# Patient Record
Sex: FEMALE | Race: WHITE | NOT HISPANIC OR LATINO | Employment: OTHER | URBAN - METROPOLITAN AREA
[De-identification: names, ages, dates, MRNs, and addresses within clinical notes are randomized per-mention and may not be internally consistent; named-entity substitution may affect disease eponyms.]

---

## 2023-03-10 PROBLEM — F41.9 ANXIETY: Status: ACTIVE | Noted: 2023-03-10

## 2023-03-10 PROBLEM — M17.0 OSTEOARTHRITIS OF BOTH KNEES: Status: ACTIVE | Noted: 2023-03-10

## 2023-03-10 PROBLEM — E66.811 CLASS 1 OBESITY WITH BODY MASS INDEX (BMI) OF 34.0 TO 34.9 IN ADULT: Status: ACTIVE | Noted: 2023-03-10

## 2023-03-10 PROBLEM — R05.9 COUGH: Status: ACTIVE | Noted: 2023-03-10

## 2023-03-10 PROBLEM — E66.9 CLASS 1 OBESITY WITH BODY MASS INDEX (BMI) OF 34.0 TO 34.9 IN ADULT: Status: ACTIVE | Noted: 2023-03-10

## 2023-03-10 PROBLEM — I15.9 BENIGN SECONDARY HYPERTENSION: Status: ACTIVE | Noted: 2023-03-10

## 2023-03-10 RX ORDER — METOPROLOL TARTRATE 50 MG/1
1 TABLET ORAL 2 TIMES DAILY
COMMUNITY
Start: 2017-01-22 | End: 2023-08-21 | Stop reason: SDUPTHER

## 2023-03-10 RX ORDER — CANDESARTAN 32 MG/1
1 TABLET ORAL DAILY
COMMUNITY
Start: 2017-01-22 | End: 2023-05-25 | Stop reason: SDUPTHER

## 2023-03-10 RX ORDER — HYDRALAZINE HYDROCHLORIDE 100 MG/1
1 TABLET, FILM COATED ORAL 2 TIMES DAILY
COMMUNITY
Start: 2017-01-22 | End: 2023-11-16 | Stop reason: SDUPTHER

## 2023-03-10 RX ORDER — CLONAZEPAM 1 MG/1
1 TABLET ORAL 2 TIMES DAILY PRN
COMMUNITY
Start: 2017-03-09 | End: 2023-03-14 | Stop reason: SDUPTHER

## 2023-03-14 ENCOUNTER — OFFICE VISIT (OUTPATIENT)
Dept: PRIMARY CARE | Facility: CLINIC | Age: 73
End: 2023-03-14
Payer: MEDICARE

## 2023-03-14 VITALS
BODY MASS INDEX: 35.81 KG/M2 | HEART RATE: 83 BPM | OXYGEN SATURATION: 97 % | WEIGHT: 194.6 LBS | SYSTOLIC BLOOD PRESSURE: 140 MMHG | HEIGHT: 62 IN | DIASTOLIC BLOOD PRESSURE: 72 MMHG | TEMPERATURE: 98.2 F

## 2023-03-14 DIAGNOSIS — F41.9 ANXIETY: Primary | ICD-10-CM

## 2023-03-14 PROCEDURE — 1036F TOBACCO NON-USER: CPT | Performed by: FAMILY MEDICINE

## 2023-03-14 PROCEDURE — 3077F SYST BP >= 140 MM HG: CPT | Performed by: FAMILY MEDICINE

## 2023-03-14 PROCEDURE — 1159F MED LIST DOCD IN RCRD: CPT | Performed by: FAMILY MEDICINE

## 2023-03-14 PROCEDURE — 3078F DIAST BP <80 MM HG: CPT | Performed by: FAMILY MEDICINE

## 2023-03-14 PROCEDURE — 99213 OFFICE O/P EST LOW 20 MIN: CPT | Performed by: FAMILY MEDICINE

## 2023-03-14 RX ORDER — COVID-19 MOLECULAR TEST ASSAY
KIT MISCELLANEOUS
COMMUNITY
Start: 2022-07-02

## 2023-03-14 RX ORDER — CLONAZEPAM 1 MG/1
1 TABLET ORAL 2 TIMES DAILY PRN
Qty: 60 TABLET | Refills: 2 | Status: SHIPPED | OUTPATIENT
Start: 2023-03-14 | End: 2023-03-15 | Stop reason: SDUPTHER

## 2023-03-14 ASSESSMENT — ENCOUNTER SYMPTOMS
LOSS OF SENSATION IN FEET: 0
DEPRESSION: 0
OCCASIONAL FEELINGS OF UNSTEADINESS: 0

## 2023-03-14 ASSESSMENT — PAIN SCALES - GENERAL: PAINLEVEL: 8

## 2023-03-14 NOTE — PROGRESS NOTES
Subjective   Patient ID: Jen Daly is a 73 y.o. female who presents for No chief complaint on file..   3 month med refills  HPI     Review of Systems    Objective   There were no vitals taken for this visit.    Physical Exam    Assessment/Plan   Problem List Items Addressed This Visit          Other    Anxiety - Primary        OARRS:  No data recorded  I have personally reviewed the OARRS report for Jen aDly. I have considered the risks of abuse, dependence, addiction and diversion    Is the patient prescribed a combination of a benzodiazepine and opioid?  Yes, I feel it is clincially indicated to continue the medication and have discussed with the patient risks/benefits/alternatives.    Last Urine Drug Screen / ordered today: Yes  Recent Results (from the past 34717 hour(s))   Drug Screen, Urine With Reflex to Confirmation    Collection Time: 12/13/22 12:00 AM   Result Value Ref Range    DRUG SCREEN COMMENT URINE SEE BELOW     Amphetamine Screen, Urine PRESUMPTIVE NEGATIVE NEGATIVE    Barbiturate Screen, Urine PRESUMPTIVE NEGATIVE NEGATIVE    BENZODIAZEPINE (PRESENCE) IN URINE BY SCREEN METHOD PRESUMPTIVE NEGATIVE NEGATIVE    Cannabinoid Screen, Urine PRESUMPTIVE NEGATIVE NEGATIVE    Cocaine Screen, Urine PRESUMPTIVE NEGATIVE NEGATIVE    Fentanyl, Ur PRESUMPTIVE NEGATIVE NEGATIVE    Methadone Screen, Urine PRESUMPTIVE NEGATIVE NEGATIVE    Opiate Screen, Urine PRESUMPTIVE NEGATIVE NEGATIVE    Oxycodone Screen, Ur PRESUMPTIVE NEGATIVE NEGATIVE    PCP Screen, Urine PRESUMPTIVE NEGATIVE NEGATIVE     Results are as expected.     Controlled Substance Agreement:  Date of the Last Agreement: 12/13/2022  Reviewed Controlled Substance Agreement including but not limited to the benefits, risks, and alternatives to treatment with a Controlled Substance medication(s).    Benzodiazepines:  What is the patient's goal of therapy? anxiety  Is this being achieved with current treatment? yes    JOAQUIN-7:  No data  recorded    Activities of Daily Living:   Is your overall impression that this patient is benefiting (symptom reduction outweighs side effects) from benzodiazepine therapy? Yes     1. Physical Functioning: Better  2. Family Relationship: Better  3. Social Relationship: Better  4. Mood: Better  5. Sleep Patterns: Better  6. Overall Function: Better

## 2023-03-15 ENCOUNTER — TELEPHONE (OUTPATIENT)
Dept: PRIMARY CARE | Facility: CLINIC | Age: 73
End: 2023-03-15
Payer: MEDICARE

## 2023-03-15 DIAGNOSIS — F41.9 ANXIETY: ICD-10-CM

## 2023-03-15 RX ORDER — CLONAZEPAM 1 MG/1
1 TABLET ORAL 2 TIMES DAILY PRN
Qty: 60 TABLET | Refills: 2 | Status: SHIPPED | OUTPATIENT
Start: 2023-03-15 | End: 2023-12-19 | Stop reason: SDUPTHER

## 2023-03-16 ENCOUNTER — APPOINTMENT (OUTPATIENT)
Dept: PRIMARY CARE | Facility: CLINIC | Age: 73
End: 2023-03-16
Payer: MEDICARE

## 2023-05-25 DIAGNOSIS — I15.9 BENIGN SECONDARY HYPERTENSION: Primary | ICD-10-CM

## 2023-05-25 RX ORDER — CANDESARTAN 32 MG/1
32 TABLET ORAL DAILY
Qty: 90 TABLET | Refills: 0 | Status: SHIPPED | OUTPATIENT
Start: 2023-05-25 | End: 2023-08-21 | Stop reason: SDUPTHER

## 2023-08-21 DIAGNOSIS — I15.9 BENIGN SECONDARY HYPERTENSION: ICD-10-CM

## 2023-08-21 DIAGNOSIS — I15.9 BENIGN SECONDARY HYPERTENSION: Primary | ICD-10-CM

## 2023-08-21 RX ORDER — METOPROLOL TARTRATE 50 MG/1
50 TABLET ORAL 2 TIMES DAILY
Qty: 180 TABLET | Refills: 0 | Status: SHIPPED | OUTPATIENT
Start: 2023-08-21 | End: 2023-11-16 | Stop reason: SDUPTHER

## 2023-08-21 RX ORDER — CANDESARTAN 32 MG/1
32 TABLET ORAL DAILY
Qty: 90 TABLET | Refills: 0 | Status: SHIPPED | OUTPATIENT
Start: 2023-08-21 | End: 2023-11-17 | Stop reason: SDUPTHER

## 2023-11-16 DIAGNOSIS — I15.9 BENIGN SECONDARY HYPERTENSION: ICD-10-CM

## 2023-11-16 RX ORDER — HYDRALAZINE HYDROCHLORIDE 100 MG/1
100 TABLET, FILM COATED ORAL 2 TIMES DAILY
Qty: 180 TABLET | Refills: 0 | Status: SHIPPED | OUTPATIENT
Start: 2023-11-16 | End: 2024-02-13 | Stop reason: SDUPTHER

## 2023-11-16 RX ORDER — METOPROLOL TARTRATE 50 MG/1
50 TABLET ORAL 2 TIMES DAILY
Qty: 180 TABLET | Refills: 0 | Status: SHIPPED | OUTPATIENT
Start: 2023-11-16 | End: 2024-02-13 | Stop reason: SDUPTHER

## 2023-11-17 DIAGNOSIS — I15.9 BENIGN SECONDARY HYPERTENSION: ICD-10-CM

## 2023-11-17 RX ORDER — CANDESARTAN 32 MG/1
32 TABLET ORAL DAILY
Qty: 90 TABLET | Refills: 0 | Status: SHIPPED | OUTPATIENT
Start: 2023-11-17 | End: 2024-02-20 | Stop reason: SDUPTHER

## 2023-12-19 ENCOUNTER — OFFICE VISIT (OUTPATIENT)
Dept: PRIMARY CARE | Facility: CLINIC | Age: 73
End: 2023-12-19
Payer: MEDICARE

## 2023-12-19 VITALS
BODY MASS INDEX: 36.14 KG/M2 | SYSTOLIC BLOOD PRESSURE: 132 MMHG | WEIGHT: 191.4 LBS | HEIGHT: 61 IN | OXYGEN SATURATION: 96 % | DIASTOLIC BLOOD PRESSURE: 70 MMHG | HEART RATE: 56 BPM | TEMPERATURE: 97.7 F

## 2023-12-19 DIAGNOSIS — Z51.81 MEDICATION MONITORING ENCOUNTER: ICD-10-CM

## 2023-12-19 DIAGNOSIS — F41.9 ANXIETY: ICD-10-CM

## 2023-12-19 DIAGNOSIS — I15.9 BENIGN SECONDARY HYPERTENSION: Primary | ICD-10-CM

## 2023-12-19 DIAGNOSIS — M17.0 PRIMARY OSTEOARTHRITIS OF BOTH KNEES: ICD-10-CM

## 2023-12-19 LAB
BASOPHILS # BLD AUTO: 0.08 X10*3/UL (ref 0–0.1)
BASOPHILS NFR BLD AUTO: 0.8 %
EOSINOPHIL # BLD AUTO: 0.79 X10*3/UL (ref 0–0.4)
EOSINOPHIL NFR BLD AUTO: 7.6 %
ERYTHROCYTE [DISTWIDTH] IN BLOOD BY AUTOMATED COUNT: 13.3 % (ref 11.5–14.5)
HCT VFR BLD AUTO: 53.5 % (ref 36–46)
HGB BLD-MCNC: 17 G/DL (ref 12–16)
IMM GRANULOCYTES # BLD AUTO: 0.03 X10*3/UL (ref 0–0.5)
IMM GRANULOCYTES NFR BLD AUTO: 0.3 % (ref 0–0.9)
LYMPHOCYTES # BLD AUTO: 1.89 X10*3/UL (ref 0.8–3)
LYMPHOCYTES NFR BLD AUTO: 18.3 %
MCH RBC QN AUTO: 28.5 PG (ref 26–34)
MCHC RBC AUTO-ENTMCNC: 31.8 G/DL (ref 32–36)
MCV RBC AUTO: 90 FL (ref 80–100)
MONOCYTES # BLD AUTO: 0.69 X10*3/UL (ref 0.05–0.8)
MONOCYTES NFR BLD AUTO: 6.7 %
NEUTROPHILS # BLD AUTO: 6.85 X10*3/UL (ref 1.6–5.5)
NEUTROPHILS NFR BLD AUTO: 66.3 %
NRBC BLD-RTO: 0 /100 WBCS (ref 0–0)
PLATELET # BLD AUTO: 312 X10*3/UL (ref 150–450)
RBC # BLD AUTO: 5.97 X10*6/UL (ref 4–5.2)
WBC # BLD AUTO: 10.3 X10*3/UL (ref 4.4–11.3)

## 2023-12-19 PROCEDURE — 1125F AMNT PAIN NOTED PAIN PRSNT: CPT | Performed by: FAMILY MEDICINE

## 2023-12-19 PROCEDURE — 85025 COMPLETE CBC W/AUTO DIFF WBC: CPT

## 2023-12-19 PROCEDURE — 1159F MED LIST DOCD IN RCRD: CPT | Performed by: FAMILY MEDICINE

## 2023-12-19 PROCEDURE — 1160F RVW MEDS BY RX/DR IN RCRD: CPT | Performed by: FAMILY MEDICINE

## 2023-12-19 PROCEDURE — 99214 OFFICE O/P EST MOD 30 MIN: CPT | Performed by: FAMILY MEDICINE

## 2023-12-19 PROCEDURE — 80053 COMPREHEN METABOLIC PANEL: CPT

## 2023-12-19 PROCEDURE — 1036F TOBACCO NON-USER: CPT | Performed by: FAMILY MEDICINE

## 2023-12-19 PROCEDURE — 80061 LIPID PANEL: CPT

## 2023-12-19 PROCEDURE — 3075F SYST BP GE 130 - 139MM HG: CPT | Performed by: FAMILY MEDICINE

## 2023-12-19 PROCEDURE — 3078F DIAST BP <80 MM HG: CPT | Performed by: FAMILY MEDICINE

## 2023-12-19 PROCEDURE — 36415 COLL VENOUS BLD VENIPUNCTURE: CPT

## 2023-12-19 PROCEDURE — 80307 DRUG TEST PRSMV CHEM ANLYZR: CPT

## 2023-12-19 RX ORDER — CLONAZEPAM 1 MG/1
1 TABLET ORAL 2 TIMES DAILY PRN
Qty: 60 TABLET | Refills: 2 | Status: SHIPPED | OUTPATIENT
Start: 2023-12-19 | End: 2024-05-14 | Stop reason: SDUPTHER

## 2023-12-19 RX ORDER — ACETAMINOPHEN 500 MG
TABLET ORAL
COMMUNITY
Start: 2008-04-21

## 2023-12-19 RX ORDER — POLYETHYLENE GLYCOL 3350, SODIUM CHLORIDE, SODIUM BICARBONATE, POTASSIUM CHLORIDE 420; 11.2; 5.72; 1.48 G/4L; G/4L; G/4L; G/4L
POWDER, FOR SOLUTION ORAL
COMMUNITY
Start: 2011-09-26

## 2023-12-19 RX ORDER — CANDESARTAN CILEXETIL AND HYDROCHLOROTHIAZIDE 32; 12.5 MG/1; MG/1
TABLET ORAL
COMMUNITY
Start: 2007-10-22 | End: 2024-02-14 | Stop reason: SDUPTHER

## 2023-12-19 RX ORDER — BISACODYL 5 MG
TABLET, DELAYED RELEASE (ENTERIC COATED) ORAL
COMMUNITY
Start: 2011-09-26

## 2023-12-19 RX ORDER — LORAZEPAM 1 MG/1
TABLET ORAL
COMMUNITY
Start: 2007-10-22 | End: 2023-12-19 | Stop reason: WASHOUT

## 2023-12-19 SDOH — ECONOMIC STABILITY: HOUSING INSECURITY
IN THE LAST 12 MONTHS, WAS THERE A TIME WHEN YOU DID NOT HAVE A STEADY PLACE TO SLEEP OR SLEPT IN A SHELTER (INCLUDING NOW)?: NO

## 2023-12-19 SDOH — ECONOMIC STABILITY: INCOME INSECURITY: IN THE LAST 12 MONTHS, WAS THERE A TIME WHEN YOU WERE NOT ABLE TO PAY THE MORTGAGE OR RENT ON TIME?: NO

## 2023-12-19 SDOH — ECONOMIC STABILITY: FOOD INSECURITY: WITHIN THE PAST 12 MONTHS, THE FOOD YOU BOUGHT JUST DIDN'T LAST AND YOU DIDN'T HAVE MONEY TO GET MORE.: NEVER TRUE

## 2023-12-19 SDOH — ECONOMIC STABILITY: TRANSPORTATION INSECURITY
IN THE PAST 12 MONTHS, HAS THE LACK OF TRANSPORTATION KEPT YOU FROM MEDICAL APPOINTMENTS OR FROM GETTING MEDICATIONS?: NO

## 2023-12-19 SDOH — ECONOMIC STABILITY: FOOD INSECURITY: WITHIN THE PAST 12 MONTHS, YOU WORRIED THAT YOUR FOOD WOULD RUN OUT BEFORE YOU GOT MONEY TO BUY MORE.: NEVER TRUE

## 2023-12-19 SDOH — ECONOMIC STABILITY: TRANSPORTATION INSECURITY
IN THE PAST 12 MONTHS, HAS LACK OF TRANSPORTATION KEPT YOU FROM MEETINGS, WORK, OR FROM GETTING THINGS NEEDED FOR DAILY LIVING?: NO

## 2023-12-19 ASSESSMENT — LIFESTYLE VARIABLES
HOW OFTEN DO YOU HAVE A DRINK CONTAINING ALCOHOL: NEVER
HOW MANY STANDARD DRINKS CONTAINING ALCOHOL DO YOU HAVE ON A TYPICAL DAY: PATIENT DOES NOT DRINK
AUDIT-C TOTAL SCORE: 0
HOW OFTEN DO YOU HAVE SIX OR MORE DRINKS ON ONE OCCASION: NEVER
SKIP TO QUESTIONS 9-10: 1

## 2023-12-19 ASSESSMENT — ENCOUNTER SYMPTOMS
NERVOUS/ANXIOUS: 1
ARTHRALGIAS: 1
CARDIOVASCULAR NEGATIVE: 1
LOSS OF SENSATION IN FEET: 0
DEPRESSION: 0
COUGH: 1
NEUROLOGICAL NEGATIVE: 1
OCCASIONAL FEELINGS OF UNSTEADINESS: 0
CONSTITUTIONAL NEGATIVE: 1
ENDOCRINE NEGATIVE: 1
SLEEP DISTURBANCE: 1

## 2023-12-19 ASSESSMENT — PATIENT HEALTH QUESTIONNAIRE - PHQ9
1. LITTLE INTEREST OR PLEASURE IN DOING THINGS: NOT AT ALL
SUM OF ALL RESPONSES TO PHQ9 QUESTIONS 1 & 2: 0
2. FEELING DOWN, DEPRESSED OR HOPELESS: NOT AT ALL

## 2023-12-19 ASSESSMENT — SOCIAL DETERMINANTS OF HEALTH (SDOH)
HOW HARD IS IT FOR YOU TO PAY FOR THE VERY BASICS LIKE FOOD, HOUSING, MEDICAL CARE, AND HEATING?: NOT HARD AT ALL
WITHIN THE LAST YEAR, HAVE YOU BEEN HUMILIATED OR EMOTIONALLY ABUSED IN OTHER WAYS BY YOUR PARTNER OR EX-PARTNER?: NO
IN THE PAST 12 MONTHS, HAS THE ELECTRIC, GAS, OIL, OR WATER COMPANY THREATENED TO SHUT OFF SERVICE IN YOUR HOME?: NO
WITHIN THE LAST YEAR, HAVE TO BEEN RAPED OR FORCED TO HAVE ANY KIND OF SEXUAL ACTIVITY BY YOUR PARTNER OR EX-PARTNER?: NO
WITHIN THE LAST YEAR, HAVE YOU BEEN AFRAID OF YOUR PARTNER OR EX-PARTNER?: NO
WITHIN THE LAST YEAR, HAVE YOU BEEN KICKED, HIT, SLAPPED, OR OTHERWISE PHYSICALLY HURT BY YOUR PARTNER OR EX-PARTNER?: NO

## 2023-12-19 ASSESSMENT — PAIN SCALES - GENERAL: PAINLEVEL: 8

## 2023-12-19 NOTE — PROGRESS NOTES
"Subjective   Patient ID: Jen Daly is a 73 y.o. female who presents for Follow-up (Anxiety med refill).   3 month med refills  HPI     Review of Systems   Constitutional: Negative.    Respiratory:  Positive for cough.    Cardiovascular: Negative.    Endocrine: Negative.    Genitourinary: Negative.    Musculoskeletal:  Positive for arthralgias.   Neurological: Negative.    Psychiatric/Behavioral:  Positive for sleep disturbance. The patient is nervous/anxious.        Objective   /70 (BP Location: Left arm)   Pulse 56   Temp 36.5 °C (97.7 °F) (Temporal)   Ht 1.549 m (5' 1\")   Wt 86.8 kg (191 lb 6.4 oz)   SpO2 96%   BMI 36.16 kg/m²     Physical Exam  Vitals and nursing note reviewed.   Constitutional:       Appearance: Normal appearance.   Cardiovascular:      Rate and Rhythm: Normal rate and regular rhythm.      Heart sounds: Normal heart sounds.   Pulmonary:      Breath sounds: Wheezing present.   Neurological:      General: No focal deficit present.      Mental Status: She is alert and oriented to person, place, and time.   Psychiatric:         Mood and Affect: Mood normal.         Behavior: Behavior normal.         Assessment/Plan patient seen here for follow-up on her clonazepam.  We are also drawing her lab work for her hypertension.  Let her know the results as soon as available.  Will see her back as needed  Problem List Items Addressed This Visit             ICD-10-CM    Anxiety F41.9    Relevant Medications    clonazePAM (KlonoPIN) 1 mg tablet    Other Relevant Orders    Drug Screen, Urine With Reflex to Confirmation    Benign secondary hypertension - Primary I15.9    Relevant Orders    Lipid Panel    CBC and Auto Differential    Comprehensive Metabolic Panel    Osteoarthritis of both knees M17.0     Other Visit Diagnoses         Codes    Medication monitoring encounter     Z51.81    Relevant Orders    Drug Screen, Urine With Reflex to Confirmation             OARRS:  Evert Dempsey, DO on " 12/19/2023 11:25 AM  I have personally reviewed the OARRS report for Jen Daly. I have considered the risks of abuse, dependence, addiction and diversion    Is the patient prescribed a combination of a benzodiazepine and opioid?  Yes, I feel it is clincially indicated to continue the medication and have discussed with the patient risks/benefits/alternatives.    Last Urine Drug Screen / ordered today: Yes  No results found for this or any previous visit (from the past 8760 hour(s)).  N/A    Controlled Substance Agreement:  Date of the Last Agreement: 12/19/23  Reviewed Controlled Substance Agreement including but not limited to the benefits, risks, and alternatives to treatment with a Controlled Substance medication(s).    Benzodiazepines:  What is the patient's goal of therapy? Sleep and anxiety  Is this being achieved with current treatment? yes    JOAQUIN-7:  No data recorded    Activities of Daily Living:   Is your overall impression that this patient is benefiting (symptom reduction outweighs side effects) from benzodiazepine therapy? Yes     1. Physical Functioning: Better  2. Family Relationship: Better  3. Social Relationship: Better  4. Mood: Better  5. Sleep Patterns: Better  6. Overall Function: Better

## 2023-12-20 LAB
ALBUMIN SERPL BCP-MCNC: 4.2 G/DL (ref 3.4–5)
ALP SERPL-CCNC: 53 U/L (ref 33–136)
ALT SERPL W P-5'-P-CCNC: 16 U/L (ref 7–45)
AMPHETAMINES UR QL SCN: NORMAL
ANION GAP SERPL CALC-SCNC: 12 MMOL/L (ref 10–20)
AST SERPL W P-5'-P-CCNC: 16 U/L (ref 9–39)
BARBITURATES UR QL SCN: NORMAL
BENZODIAZ UR QL SCN: NORMAL
BILIRUB SERPL-MCNC: 0.6 MG/DL (ref 0–1.2)
BUN SERPL-MCNC: 16 MG/DL (ref 6–23)
BZE UR QL SCN: NORMAL
CALCIUM SERPL-MCNC: 9.7 MG/DL (ref 8.6–10.6)
CANNABINOIDS UR QL SCN: NORMAL
CHLORIDE SERPL-SCNC: 106 MMOL/L (ref 98–107)
CHOLEST SERPL-MCNC: 208 MG/DL (ref 0–199)
CHOLESTEROL/HDL RATIO: 3.6
CO2 SERPL-SCNC: 30 MMOL/L (ref 21–32)
CREAT SERPL-MCNC: 0.62 MG/DL (ref 0.5–1.05)
FENTANYL+NORFENTANYL UR QL SCN: NORMAL
GFR SERPL CREATININE-BSD FRML MDRD: >90 ML/MIN/1.73M*2
GLUCOSE SERPL-MCNC: 99 MG/DL (ref 74–99)
HDLC SERPL-MCNC: 57.5 MG/DL
LDLC SERPL CALC-MCNC: 110 MG/DL
NON HDL CHOLESTEROL: 151 MG/DL (ref 0–149)
OPIATES UR QL SCN: NORMAL
OXYCODONE+OXYMORPHONE UR QL SCN: NORMAL
PCP UR QL SCN: NORMAL
POTASSIUM SERPL-SCNC: 4.5 MMOL/L (ref 3.5–5.3)
PROT SERPL-MCNC: 6.6 G/DL (ref 6.4–8.2)
SODIUM SERPL-SCNC: 143 MMOL/L (ref 136–145)
TRIGL SERPL-MCNC: 205 MG/DL (ref 0–149)
VLDL: 41 MG/DL (ref 0–40)

## 2024-01-12 ENCOUNTER — NURSE TRIAGE (OUTPATIENT)
Dept: PRIMARY CARE | Facility: CLINIC | Age: 74
End: 2024-01-12
Payer: MEDICARE

## 2024-02-13 DIAGNOSIS — I15.9 BENIGN SECONDARY HYPERTENSION: ICD-10-CM

## 2024-02-13 RX ORDER — HYDRALAZINE HYDROCHLORIDE 100 MG/1
100 TABLET, FILM COATED ORAL 2 TIMES DAILY
Qty: 180 TABLET | Refills: 0 | Status: SHIPPED | OUTPATIENT
Start: 2024-02-13 | End: 2024-05-16

## 2024-02-13 RX ORDER — METOPROLOL TARTRATE 50 MG/1
50 TABLET ORAL 2 TIMES DAILY
Qty: 180 TABLET | Refills: 0 | Status: SHIPPED | OUTPATIENT
Start: 2024-02-13 | End: 2024-05-16

## 2024-02-14 DIAGNOSIS — I15.9 BENIGN SECONDARY HYPERTENSION: Primary | ICD-10-CM

## 2024-02-15 RX ORDER — CANDESARTAN CILEXETIL AND HYDROCHLOROTHIAZIDE 32; 12.5 MG/1; MG/1
1 TABLET ORAL DAILY
Qty: 90 TABLET | Refills: 3 | Status: SHIPPED | OUTPATIENT
Start: 2024-02-15 | End: 2024-04-09 | Stop reason: WASHOUT

## 2024-02-20 DIAGNOSIS — I15.9 BENIGN SECONDARY HYPERTENSION: ICD-10-CM

## 2024-02-20 RX ORDER — CANDESARTAN 32 MG/1
32 TABLET ORAL DAILY
Qty: 90 TABLET | Refills: 0 | Status: SHIPPED | OUTPATIENT
Start: 2024-02-20 | End: 2024-05-16

## 2024-02-20 NOTE — TELEPHONE ENCOUNTER
Patient called stating she is confused in regards to Candesartan 32mg. Is she supposed to take the regular or the combination? States it wasn't discussed at her last appt to start taking Candesartan  - hydrochlorothiazide.   *please advise

## 2024-04-09 ENCOUNTER — OFFICE VISIT (OUTPATIENT)
Dept: PRIMARY CARE | Facility: CLINIC | Age: 74
End: 2024-04-09
Payer: MEDICARE

## 2024-04-09 VITALS
HEIGHT: 62 IN | DIASTOLIC BLOOD PRESSURE: 72 MMHG | OXYGEN SATURATION: 96 % | WEIGHT: 193.8 LBS | HEART RATE: 69 BPM | TEMPERATURE: 97.3 F | BODY MASS INDEX: 35.66 KG/M2 | SYSTOLIC BLOOD PRESSURE: 144 MMHG

## 2024-04-09 DIAGNOSIS — M17.0 PRIMARY OSTEOARTHRITIS OF BOTH KNEES: ICD-10-CM

## 2024-04-09 DIAGNOSIS — E66.01 OBESITY, MORBID (MULTI): ICD-10-CM

## 2024-04-09 DIAGNOSIS — Z00.00 MEDICARE ANNUAL WELLNESS VISIT, SUBSEQUENT: Primary | ICD-10-CM

## 2024-04-09 DIAGNOSIS — C18.9 MALIGNANT NEOPLASM OF COLON, UNSPECIFIED PART OF COLON (MULTI): ICD-10-CM

## 2024-04-09 PROCEDURE — G0439 PPPS, SUBSEQ VISIT: HCPCS | Performed by: FAMILY MEDICINE

## 2024-04-09 PROCEDURE — 3077F SYST BP >= 140 MM HG: CPT | Performed by: FAMILY MEDICINE

## 2024-04-09 PROCEDURE — 1125F AMNT PAIN NOTED PAIN PRSNT: CPT | Performed by: FAMILY MEDICINE

## 2024-04-09 PROCEDURE — 1170F FXNL STATUS ASSESSED: CPT | Performed by: FAMILY MEDICINE

## 2024-04-09 PROCEDURE — 99214 OFFICE O/P EST MOD 30 MIN: CPT | Performed by: FAMILY MEDICINE

## 2024-04-09 PROCEDURE — 99497 ADVNCD CARE PLAN 30 MIN: CPT | Performed by: FAMILY MEDICINE

## 2024-04-09 PROCEDURE — 1159F MED LIST DOCD IN RCRD: CPT | Performed by: FAMILY MEDICINE

## 2024-04-09 PROCEDURE — 1160F RVW MEDS BY RX/DR IN RCRD: CPT | Performed by: FAMILY MEDICINE

## 2024-04-09 PROCEDURE — 1036F TOBACCO NON-USER: CPT | Performed by: FAMILY MEDICINE

## 2024-04-09 PROCEDURE — 3078F DIAST BP <80 MM HG: CPT | Performed by: FAMILY MEDICINE

## 2024-04-09 RX ORDER — AZITHROMYCIN 250 MG/1
TABLET, FILM COATED ORAL
COMMUNITY
Start: 2017-03-03 | End: 2024-04-09 | Stop reason: WASHOUT

## 2024-04-09 RX ORDER — IPRATROPIUM BROMIDE AND ALBUTEROL SULFATE 2.5; .5 MG/3ML; MG/3ML
SOLUTION RESPIRATORY (INHALATION)
COMMUNITY
Start: 2017-03-03 | End: 2024-04-09 | Stop reason: WASHOUT

## 2024-04-09 ASSESSMENT — ACTIVITIES OF DAILY LIVING (ADL)
TOILETING: INDEPENDENT
BATHING: INDEPENDENT
FEEDING YOURSELF: INDEPENDENT
WALKS IN HOME: INDEPENDENT
JUDGMENT_ADEQUATE_SAFELY_COMPLETE_DAILY_ACTIVITIES: YES
GROOMING: INDEPENDENT
PATIENT'S MEMORY ADEQUATE TO SAFELY COMPLETE DAILY ACTIVITIES?: YES
DRESSING YOURSELF: INDEPENDENT
ADEQUATE_TO_COMPLETE_ADL: YES

## 2024-04-09 ASSESSMENT — ANXIETY QUESTIONNAIRES
5. BEING SO RESTLESS THAT IT IS HARD TO SIT STILL: NOT AT ALL
1. FEELING NERVOUS, ANXIOUS, OR ON EDGE: NOT AT ALL
2. NOT BEING ABLE TO STOP OR CONTROL WORRYING: NOT AT ALL
6. BECOMING EASILY ANNOYED OR IRRITABLE: NOT AT ALL
3. WORRYING TOO MUCH ABOUT DIFFERENT THINGS: NOT AT ALL
7. FEELING AFRAID AS IF SOMETHING AWFUL MIGHT HAPPEN: NOT AT ALL
4. TROUBLE RELAXING: NOT AT ALL
GAD7 TOTAL SCORE: 0

## 2024-04-09 ASSESSMENT — ENCOUNTER SYMPTOMS
CARDIOVASCULAR NEGATIVE: 1
OCCASIONAL FEELINGS OF UNSTEADINESS: 0
ARTHRALGIAS: 1
LOSS OF SENSATION IN FEET: 0
ENDOCRINE NEGATIVE: 1
GASTROINTESTINAL NEGATIVE: 1
CONSTITUTIONAL NEGATIVE: 1
PSYCHIATRIC NEGATIVE: 1
DEPRESSION: 0
RESPIRATORY NEGATIVE: 1
NEUROLOGICAL NEGATIVE: 1

## 2024-04-09 ASSESSMENT — PAIN SCALES - GENERAL: PAINLEVEL: 9

## 2024-04-09 NOTE — ACP (ADVANCE CARE PLANNING)
Confirming Previous Code Status:   Advance Care Planning Note     Discussion Date: 04/09/24   Discussion Participants: patient    The patient wishes to discuss Advance Care Planning today and the following is a brief summary of our discussion.     Patient has capacity to make their own medical decisions: Yes  Health Care Agent/Surrogate Decision Maker documented in chart: Yes    Documents on file and valid:  Advance Directive/Living Will: No   Health Care Power of : No  Other: none    Communication of Medical Status/Prognosis:   yes     Communication of Treatment Goals/Options:   yes     Treatment Decisions  Goals of Care: survival is paramount regardless of prognosis, treatment outcome, or burden   yes  Follow Up Plan  no  Team Members  myself  Time Statement: Total face to face time spent on advance care planning was 16 minutes with 16 minutes spent in counseling, including the explanation.    Evert Dempsey DO  4/9/2024 11:33 AM

## 2024-04-09 NOTE — PROGRESS NOTES
"Subjective   Patient ID: Jen Daly is a 74 y.o. female who presents for Annual Exam (Assessment annual medicare wellness fasting bw).    HPI     Review of Systems   Constitutional: Negative.    HENT: Negative.     Respiratory: Negative.     Cardiovascular: Negative.    Gastrointestinal: Negative.    Endocrine: Negative.    Genitourinary: Negative.    Musculoskeletal:  Positive for arthralgias.   Neurological: Negative.    Psychiatric/Behavioral: Negative.         Objective   /72 (BP Location: Right arm)   Pulse 69   Temp 36.3 °C (97.3 °F) (Temporal)   Ht 1.562 m (5' 1.5\")   Wt 87.9 kg (193 lb 12.8 oz)   SpO2 96%   BMI 36.03 kg/m²     Physical Exam  Vitals and nursing note reviewed.   Constitutional:       Appearance: Normal appearance.   HENT:      Mouth/Throat:      Pharynx: Oropharynx is clear.   Cardiovascular:      Rate and Rhythm: Normal rate and regular rhythm.      Pulses: Normal pulses.      Heart sounds: Normal heart sounds.   Pulmonary:      Breath sounds: Normal breath sounds.   Abdominal:      Palpations: Abdomen is soft.   Musculoskeletal:      Comments: Severe degenerative joint disease especially in the left knee   Neurological:      General: No focal deficit present.      Mental Status: She is alert and oriented to person, place, and time.   Psychiatric:         Mood and Affect: Mood normal.         Behavior: Behavior normal.         Assessment/Plan patient seen here for an annual Medicare wellness exam.  We reviewed her questionnaire she is agreeable to her responses.  Did discuss advanced directives.  She has no significant difficulty with depression or anxiety.  She already had her lab work drawn in December.  We did refer her for orthopedic surgery for her knee and some physical therapy.  Will see her back in a year  Problem List Items Addressed This Visit             ICD-10-CM    Osteoarthritis of both knees M17.0    Relevant Orders    Referral to Orthopaedic Surgery    Referral " to Physical Therapy    Obesity, morbid (CMS/HCC) E66.01    Malignant neoplasm of colon, unspecified part of colon (CMS/[facility]) C18.9     Other Visit Diagnoses         Codes    Medicare annual wellness visit, subsequent    -  Primary Z00.00

## 2024-05-14 ENCOUNTER — OFFICE VISIT (OUTPATIENT)
Dept: PRIMARY CARE | Facility: CLINIC | Age: 74
End: 2024-05-14
Payer: MEDICARE

## 2024-05-14 VITALS
WEIGHT: 191.2 LBS | DIASTOLIC BLOOD PRESSURE: 60 MMHG | BODY MASS INDEX: 35.54 KG/M2 | TEMPERATURE: 97.4 F | OXYGEN SATURATION: 95 % | SYSTOLIC BLOOD PRESSURE: 124 MMHG | HEART RATE: 58 BPM

## 2024-05-14 DIAGNOSIS — F41.9 ANXIETY: ICD-10-CM

## 2024-05-14 PROCEDURE — 99213 OFFICE O/P EST LOW 20 MIN: CPT | Performed by: FAMILY MEDICINE

## 2024-05-14 PROCEDURE — 3074F SYST BP LT 130 MM HG: CPT | Performed by: FAMILY MEDICINE

## 2024-05-14 PROCEDURE — 1125F AMNT PAIN NOTED PAIN PRSNT: CPT | Performed by: FAMILY MEDICINE

## 2024-05-14 PROCEDURE — 3078F DIAST BP <80 MM HG: CPT | Performed by: FAMILY MEDICINE

## 2024-05-14 PROCEDURE — 1159F MED LIST DOCD IN RCRD: CPT | Performed by: FAMILY MEDICINE

## 2024-05-14 PROCEDURE — 1160F RVW MEDS BY RX/DR IN RCRD: CPT | Performed by: FAMILY MEDICINE

## 2024-05-14 RX ORDER — CLONAZEPAM 1 MG/1
1 TABLET ORAL 2 TIMES DAILY PRN
Qty: 60 TABLET | Refills: 2 | Status: SHIPPED | OUTPATIENT
Start: 2024-05-14

## 2024-05-14 RX ORDER — CANDESARTAN CILEXETIL AND HYDROCHLOROTHIAZIDE 32; 12.5 MG/1; MG/1
TABLET ORAL
COMMUNITY
Start: 2024-05-13

## 2024-05-14 ASSESSMENT — ENCOUNTER SYMPTOMS
NERVOUS/ANXIOUS: 1
DEPRESSION: 0
SLEEP DISTURBANCE: 1
ARTHRALGIAS: 1
CONSTITUTIONAL NEGATIVE: 1

## 2024-05-14 ASSESSMENT — PAIN SCALES - GENERAL: PAINLEVEL: 9

## 2024-05-14 NOTE — PROGRESS NOTES
Subjective   Patient ID: Jen Daly is a 74 y.o. female who presents for Med Refill.   3 month med refills  HPI     Review of Systems   Constitutional: Negative.    Musculoskeletal:  Positive for arthralgias.   Psychiatric/Behavioral:  Positive for sleep disturbance. The patient is nervous/anxious.        Objective   /60 (BP Location: Right arm, Patient Position: Sitting, BP Cuff Size: Small adult)   Pulse 58   Temp 36.3 °C (97.4 °F) (Temporal)   Wt 86.7 kg (191 lb 3.2 oz)   SpO2 95%   BMI 35.54 kg/m²     Physical Exam  Vitals and nursing note reviewed.   Constitutional:       Appearance: Normal appearance.   Neurological:      General: No focal deficit present.      Mental Status: She is alert and oriented to person, place, and time.   Psychiatric:         Mood and Affect: Mood normal.         Behavior: Behavior normal.         Assessment/Plan   Problem List Items Addressed This Visit             ICD-10-CM    Anxiety F41.9    Relevant Medications    clonazePAM (KlonoPIN) 1 mg tablet        OARRS:  Evert Dempsey DO on 5/14/2024 11:28 AM  I have personally reviewed the OARRS report for Jen Daly. I have considered the risks of abuse, dependence, addiction and diversion    Is the patient prescribed a combination of a benzodiazepine and opioid?  Yes, I feel it is clincially indicated to continue the medication and have discussed with the patient risks/benefits/alternatives.    Last Urine Drug Screen / ordered today: No  Recent Results (from the past 8760 hour(s))   Drug Screen, Urine With Reflex to Confirmation    Collection Time: 12/19/23 11:30 AM   Result Value Ref Range    Amphetamine Screen, Urine Presumptive Negative Presumptive Negative    Barbiturate Screen, Urine Presumptive Negative Presumptive Negative    Benzodiazepines Screen, Urine Presumptive Negative Presumptive Negative    Cannabinoid Screen, Urine Presumptive Negative Presumptive Negative    Cocaine Metabolite Screen, Urine  Presumptive Negative Presumptive Negative    Fentanyl Screen, Urine Presumptive Negative Presumptive Negative    Opiate Screen, Urine Presumptive Negative Presumptive Negative    Oxycodone Screen, Urine Presumptive Negative Presumptive Negative    PCP Screen, Urine Presumptive Negative Presumptive Negative     Results are as expected.     Controlled Substance Agreement:  Date of the Last Agreement: 12/19/23  Reviewed Controlled Substance Agreement including but not limited to the benefits, risks, and alternatives to treatment with a Controlled Substance medication(s).    Benzodiazepines:  What is the patient's goal of therapy? sleep  Is this being achieved with current treatment? yes    JOAQUIN-7:  No data recorded    Activities of Daily Living:   Is your overall impression that this patient is benefiting (symptom reduction outweighs side effects) from benzodiazepine therapy? Yes     1. Physical Functioning: Better  2. Family Relationship: Better  3. Social Relationship: Better  4. Mood: Better  5. Sleep Patterns: Better  6. Overall Function: Better

## 2024-05-15 DIAGNOSIS — I15.9 BENIGN SECONDARY HYPERTENSION: ICD-10-CM

## 2024-05-15 PROBLEM — Z85.038 HISTORY OF MALIGNANT NEOPLASM OF COLON: Status: ACTIVE | Noted: 2024-05-15

## 2024-05-15 PROBLEM — M19.91 LOCALIZED, PRIMARY OSTEOARTHRITIS: Status: ACTIVE | Noted: 2024-04-17

## 2024-05-16 RX ORDER — CANDESARTAN 32 MG/1
32 TABLET ORAL DAILY
Qty: 90 TABLET | Refills: 0 | Status: SHIPPED | OUTPATIENT
Start: 2024-05-16

## 2024-05-16 RX ORDER — METOPROLOL TARTRATE 50 MG/1
50 TABLET ORAL 2 TIMES DAILY
Qty: 180 TABLET | Refills: 0 | Status: SHIPPED | OUTPATIENT
Start: 2024-05-16

## 2024-05-16 RX ORDER — HYDRALAZINE HYDROCHLORIDE 100 MG/1
100 TABLET, FILM COATED ORAL 2 TIMES DAILY
Qty: 180 TABLET | Refills: 0 | Status: SHIPPED | OUTPATIENT
Start: 2024-05-16

## 2024-08-12 DIAGNOSIS — I15.9 BENIGN SECONDARY HYPERTENSION: ICD-10-CM

## 2024-08-12 RX ORDER — CANDESARTAN 32 MG/1
32 TABLET ORAL DAILY
Qty: 90 TABLET | Refills: 0 | Status: SHIPPED | OUTPATIENT
Start: 2024-08-12

## 2024-09-19 DIAGNOSIS — I15.9 BENIGN SECONDARY HYPERTENSION: ICD-10-CM

## 2024-09-19 RX ORDER — HYDRALAZINE HYDROCHLORIDE 100 MG/1
100 TABLET, FILM COATED ORAL 2 TIMES DAILY
Qty: 180 TABLET | Refills: 0 | Status: SHIPPED | OUTPATIENT
Start: 2024-09-19

## 2024-09-19 RX ORDER — METOPROLOL TARTRATE 50 MG/1
50 TABLET ORAL 2 TIMES DAILY
Qty: 180 TABLET | Refills: 0 | Status: SHIPPED | OUTPATIENT
Start: 2024-09-19

## 2024-10-23 ENCOUNTER — TELEPHONE (OUTPATIENT)
Dept: PRIMARY CARE | Facility: CLINIC | Age: 74
End: 2024-10-23
Payer: MEDICARE

## 2024-10-23 NOTE — TELEPHONE ENCOUNTER
Dr. Brown office is calling wondering if he can put her on xarelto for 1 month since she is unable to take baby kristen Anaya  [phone] ext [phone]

## 2024-10-31 ENCOUNTER — APPOINTMENT (OUTPATIENT)
Dept: PRIMARY CARE | Facility: CLINIC | Age: 74
End: 2024-10-31
Payer: MEDICARE

## 2024-10-31 VITALS
DIASTOLIC BLOOD PRESSURE: 80 MMHG | WEIGHT: 195.6 LBS | SYSTOLIC BLOOD PRESSURE: 158 MMHG | HEART RATE: 62 BPM | BODY MASS INDEX: 35.99 KG/M2 | HEIGHT: 62 IN | OXYGEN SATURATION: 98 % | TEMPERATURE: 97.3 F

## 2024-10-31 DIAGNOSIS — F41.9 ANXIETY: ICD-10-CM

## 2024-10-31 DIAGNOSIS — M17.0 PRIMARY OSTEOARTHRITIS OF BOTH KNEES: ICD-10-CM

## 2024-10-31 DIAGNOSIS — C18.9 MALIGNANT NEOPLASM OF COLON, UNSPECIFIED PART OF COLON (MULTI): ICD-10-CM

## 2024-10-31 DIAGNOSIS — I15.9 BENIGN SECONDARY HYPERTENSION: Primary | ICD-10-CM

## 2024-10-31 PROCEDURE — G2211 COMPLEX E/M VISIT ADD ON: HCPCS | Performed by: FAMILY MEDICINE

## 2024-10-31 PROCEDURE — 3008F BODY MASS INDEX DOCD: CPT | Performed by: FAMILY MEDICINE

## 2024-10-31 PROCEDURE — 1036F TOBACCO NON-USER: CPT | Performed by: FAMILY MEDICINE

## 2024-10-31 PROCEDURE — 1160F RVW MEDS BY RX/DR IN RCRD: CPT | Performed by: FAMILY MEDICINE

## 2024-10-31 PROCEDURE — 1125F AMNT PAIN NOTED PAIN PRSNT: CPT | Performed by: FAMILY MEDICINE

## 2024-10-31 PROCEDURE — 3079F DIAST BP 80-89 MM HG: CPT | Performed by: FAMILY MEDICINE

## 2024-10-31 PROCEDURE — 1159F MED LIST DOCD IN RCRD: CPT | Performed by: FAMILY MEDICINE

## 2024-10-31 PROCEDURE — 99213 OFFICE O/P EST LOW 20 MIN: CPT | Performed by: FAMILY MEDICINE

## 2024-10-31 PROCEDURE — 3077F SYST BP >= 140 MM HG: CPT | Performed by: FAMILY MEDICINE

## 2024-10-31 RX ORDER — MUPIROCIN 20 MG/G
OINTMENT TOPICAL 2 TIMES DAILY
COMMUNITY
Start: 2024-10-29 | End: 2024-11-03

## 2024-10-31 RX ORDER — CLONAZEPAM 1 MG/1
1 TABLET ORAL 2 TIMES DAILY PRN
Qty: 60 TABLET | Refills: 2 | Status: SHIPPED | OUTPATIENT
Start: 2024-10-31

## 2024-10-31 ASSESSMENT — PAIN SCALES - GENERAL: PAINLEVEL_OUTOF10: 7

## 2024-10-31 ASSESSMENT — ENCOUNTER SYMPTOMS
DEPRESSION: 0
ARTHRALGIAS: 0
OCCASIONAL FEELINGS OF UNSTEADINESS: 0
LOSS OF SENSATION IN FEET: 0
NERVOUS/ANXIOUS: 1

## 2024-11-09 DIAGNOSIS — I15.9 BENIGN SECONDARY HYPERTENSION: ICD-10-CM

## 2024-11-11 RX ORDER — CANDESARTAN 32 MG/1
32 TABLET ORAL DAILY
Qty: 90 TABLET | Refills: 0 | Status: SHIPPED | OUTPATIENT
Start: 2024-11-11

## 2024-11-18 ENCOUNTER — TELEPHONE (OUTPATIENT)
Dept: PRIMARY CARE | Facility: CLINIC | Age: 74
End: 2024-11-18
Payer: MEDICARE

## 2024-11-18 DIAGNOSIS — R11.0 NAUSEA: ICD-10-CM

## 2024-11-18 DIAGNOSIS — R11.0 NAUSEA: Primary | ICD-10-CM

## 2024-11-18 RX ORDER — ONDANSETRON 4 MG/1
4 TABLET, FILM COATED ORAL EVERY 8 HOURS PRN
Qty: 20 TABLET | Refills: 0 | Status: SHIPPED | OUTPATIENT
Start: 2024-11-18 | End: 2024-11-25

## 2024-11-18 RX ORDER — ONDANSETRON 4 MG/1
4 TABLET, FILM COATED ORAL EVERY 8 HOURS PRN
Qty: 20 TABLET | Refills: 0 | Status: SHIPPED | OUTPATIENT
Start: 2024-11-18 | End: 2024-11-18 | Stop reason: SDUPTHER

## 2024-11-18 NOTE — TELEPHONE ENCOUNTER
Patient's  called stating she had surgery done for her knee at the [facility]. She has been feeling nauseous, if [facility] can [facility].

## 2024-12-19 DIAGNOSIS — I15.9 BENIGN SECONDARY HYPERTENSION: ICD-10-CM

## 2024-12-19 RX ORDER — METOPROLOL TARTRATE 50 MG/1
50 TABLET ORAL 2 TIMES DAILY
Qty: 180 TABLET | Refills: 0 | Status: SHIPPED | OUTPATIENT
Start: 2024-12-19 | End: 2024-12-20 | Stop reason: SDUPTHER

## 2024-12-19 RX ORDER — HYDRALAZINE HYDROCHLORIDE 100 MG/1
100 TABLET, FILM COATED ORAL 2 TIMES DAILY
Qty: 180 TABLET | Refills: 0 | Status: SHIPPED | OUTPATIENT
Start: 2024-12-19 | End: 2024-12-20 | Stop reason: SDUPTHER

## 2024-12-20 ENCOUNTER — TELEPHONE (OUTPATIENT)
Dept: PRIMARY CARE | Facility: CLINIC | Age: 74
End: 2024-12-20
Payer: MEDICARE

## 2024-12-20 DIAGNOSIS — I15.9 BENIGN SECONDARY HYPERTENSION: ICD-10-CM

## 2024-12-20 RX ORDER — HYDRALAZINE HYDROCHLORIDE 100 MG/1
100 TABLET, FILM COATED ORAL 2 TIMES DAILY
Qty: 180 TABLET | Refills: 0 | Status: SHIPPED | OUTPATIENT
Start: 2024-12-20 | End: 2024-12-20 | Stop reason: SDUPTHER

## 2024-12-20 RX ORDER — METOPROLOL TARTRATE 50 MG/1
50 TABLET ORAL 2 TIMES DAILY
Qty: 180 TABLET | Refills: 1 | Status: SHIPPED | OUTPATIENT
Start: 2024-12-20

## 2024-12-20 RX ORDER — METOPROLOL TARTRATE 50 MG/1
50 TABLET ORAL 2 TIMES DAILY
Qty: 180 TABLET | Refills: 0 | Status: SHIPPED | OUTPATIENT
Start: 2024-12-20 | End: 2024-12-20 | Stop reason: SDUPTHER

## 2024-12-20 RX ORDER — HYDRALAZINE HYDROCHLORIDE 100 MG/1
100 TABLET, FILM COATED ORAL 2 TIMES DAILY
Qty: 180 TABLET | Refills: 1 | Status: SHIPPED | OUTPATIENT
Start: 2024-12-20

## 2024-12-20 NOTE — TELEPHONE ENCOUNTER
Can you please send these 2 scripts to [facility] please. They were sent to the wrong pharmacy yesterday. Ty

## 2025-02-01 DIAGNOSIS — I15.9 BENIGN SECONDARY HYPERTENSION: ICD-10-CM

## 2025-02-03 RX ORDER — CANDESARTAN 32 MG/1
32 TABLET ORAL DAILY
Qty: 90 TABLET | Refills: 0 | Status: SHIPPED | OUTPATIENT
Start: 2025-02-03

## 2025-03-21 ENCOUNTER — HOSPITAL ENCOUNTER (INPATIENT)
Facility: HOSPITAL | Age: 75
LOS: 9 days | Discharge: HOME HEALTH | End: 2025-03-30
Attending: PHYSICAL MEDICINE & REHABILITATION | Admitting: PHYSICAL MEDICINE & REHABILITATION
Payer: MEDICARE

## 2025-03-21 DIAGNOSIS — Z96.651 HISTORY OF TOTAL RIGHT KNEE REPLACEMENT (TKR): Primary | ICD-10-CM

## 2025-03-21 PROCEDURE — 1180000001 HC REHAB PRIVATE ROOM DAILY

## 2025-03-21 ASSESSMENT — PAIN SCALES - GENERAL: PAINLEVEL_OUTOF10: 8

## 2025-03-21 ASSESSMENT — PAIN - FUNCTIONAL ASSESSMENT: PAIN_FUNCTIONAL_ASSESSMENT: 0-10

## 2025-03-21 NOTE — PREADMISSION SCREENING NOTE
Physical Medicine and Rehabilitation  Preadmission Screening    Rehab Physician's Review and Admission Determination:  Jen Daly is a 75 y.o. female who needs acute inpatient rehabilitation in order to achieve the functional goals outlined below. She requires close rehabilitation physician monitoring and management due to her complex medical conditions and co-morbidities with the primary indication of:  Rehab Admission Indication: Orthopaedic Disorders: Orthopaedic Disorders: 0008.61 Status Post Unilateral Knee Replacement. Comorbid conditions that will impact course of rehabilitation: Comorbid Conditions in addition to those listed above HTN, heart murmur, anxiety, urinary incontinence, post-op pain, sinus bradycardia and severe obesity . She requires 24-hour rehabilitation nursing to manage bowel and bladder function, skin care, surgical incision, nutrition and fluid intake, pulmonary hygiene, pain control, safety, medication management, and patient/family goals. In addition, rehabilitation nursing will reiterate and reinforce therapy skills and equipment use, including ADLs, as well as provide education to the patient and family. Jen Daly is willing to participate in and is able to tolerate the proposed plan of care.    History of Present Illness: Admitted to [facility] on 3/18/2025. Jen Daly is a 74 yo female who had a Right TKA on 3/18 (Dr. Yasmany Brown) and tolerated well. PMH includes HTN, heart murmur, anxiety, OA, multiple falls x 3 prior to admission due to R knee buckling, fall after Left knee replacement in Nov 2024, and severe obesity.     ALLERGIES: ASA    Medications: Robaxin, Vibramycin, Atacand, hydralazine, Lopressor, acetaminophen, dulcolax, M.O.M., Klonopin, Colace, senna, Vit C, Eliquis, oxycodone    Prior Functional Status:  Lives with Spouse and son, 24 hr assistance available, 1st floor set-up is available   Self-Care: IND w/ADLs  Ambulation: MOD IND w/cane  Stairs: 1  RHEA without rail  Cognition: alert and oriented x 3  Wheelchair: none  Devices: hand held shower, shower chair, cane, WW, reacher, BSC, rollator, grab bars in shower and around toilet    Current Functional Status:  Eating: IND  Oral hygiene: SET UP  Toileting: MOD  Bathing: unknown  Upper body dressing: MIN  Lower body dressing: MAX to don Depends  Bed Mobility: MIN assist to move right LE to L side of bed, sit to stand MOD, stand to sit MOD  Transfers: MIN x 1, use of gait belt, cues for hand placement, ambulated 12' x 1 MIN assist x 1 with decreased candence and step pattern, use of gait belt and chair follow, difficulty changing direction/turning, step length decreased, non-functional gait speed, UE weight bearing on assistive device excessive, foot clearing decreased on right, right weight bearing decreased, knee stability during stance phase decreased, knee flexion during stance increased  Bladder and Bowel: urinary incontinence, wears Depends  Cognition: alert and oriented x 3    Rehabilitation Goals and Plan:  Expected level of improvement: MOD IND  Likelihood of reaching these goals: good.  Therapy treatments needed to achieve these goals: physical therapy, occupational therapy, nursing, aide, and CM .  Barriers to achieving these goals: Pain   Expected length of stay: 5-7 day(s)    When medically stable, anticipated discharge disposition: home with home health care  The potential to achieve that is good.

## 2025-03-22 PROBLEM — Z96.651 HISTORY OF TOTAL RIGHT KNEE REPLACEMENT (TKR): Status: ACTIVE | Noted: 2025-03-22

## 2025-03-22 PROBLEM — M25.469 REDNESS AND SWELLING OF KNEE: Status: ACTIVE | Noted: 2025-03-22

## 2025-03-22 PROBLEM — R23.8 REDNESS AND SWELLING OF KNEE: Status: ACTIVE | Noted: 2025-03-22

## 2025-03-22 LAB
ANION GAP SERPL CALC-SCNC: 10 MMOL/L (ref 10–20)
BUN SERPL-MCNC: 14 MG/DL (ref 6–23)
CALCIUM SERPL-MCNC: 8.4 MG/DL (ref 8.6–10.3)
CHLORIDE SERPL-SCNC: 104 MMOL/L (ref 98–107)
CO2 SERPL-SCNC: 31 MMOL/L (ref 21–32)
CREAT SERPL-MCNC: 0.53 MG/DL (ref 0.5–1.05)
EGFRCR SERPLBLD CKD-EPI 2021: >90 ML/MIN/1.73M*2
ERYTHROCYTE [DISTWIDTH] IN BLOOD BY AUTOMATED COUNT: 13.8 % (ref 11.5–14.5)
GLUCOSE SERPL-MCNC: 102 MG/DL (ref 74–99)
HCT VFR BLD AUTO: 34.2 % (ref 36–46)
HGB BLD-MCNC: 10.2 G/DL (ref 12–16)
MCH RBC QN AUTO: 26.9 PG (ref 26–34)
MCHC RBC AUTO-ENTMCNC: 29.8 G/DL (ref 32–36)
MCV RBC AUTO: 90 FL (ref 80–100)
NRBC BLD-RTO: 0 /100 WBCS (ref 0–0)
PLATELET # BLD AUTO: 310 X10*3/UL (ref 150–450)
POTASSIUM SERPL-SCNC: 4 MMOL/L (ref 3.5–5.3)
RBC # BLD AUTO: 3.79 X10*6/UL (ref 4–5.2)
SODIUM SERPL-SCNC: 141 MMOL/L (ref 136–145)
WBC # BLD AUTO: 10.4 X10*3/UL (ref 4.4–11.3)

## 2025-03-22 PROCEDURE — 2500000001 HC RX 250 WO HCPCS SELF ADMINISTERED DRUGS (ALT 637 FOR MEDICARE OP): Performed by: PHYSICAL MEDICINE & REHABILITATION

## 2025-03-22 PROCEDURE — 97166 OT EVAL MOD COMPLEX 45 MIN: CPT | Mod: GO

## 2025-03-22 PROCEDURE — 97116 GAIT TRAINING THERAPY: CPT | Mod: GP

## 2025-03-22 PROCEDURE — 85027 COMPLETE CBC AUTOMATED: CPT | Performed by: PHYSICAL MEDICINE & REHABILITATION

## 2025-03-22 PROCEDURE — 1180000001 HC REHAB PRIVATE ROOM DAILY

## 2025-03-22 PROCEDURE — 80048 BASIC METABOLIC PNL TOTAL CA: CPT | Performed by: PHYSICAL MEDICINE & REHABILITATION

## 2025-03-22 PROCEDURE — 97530 THERAPEUTIC ACTIVITIES: CPT | Mod: GP

## 2025-03-22 PROCEDURE — 97535 SELF CARE MNGMENT TRAINING: CPT | Mod: GO

## 2025-03-22 PROCEDURE — 36415 COLL VENOUS BLD VENIPUNCTURE: CPT | Performed by: PHYSICAL MEDICINE & REHABILITATION

## 2025-03-22 PROCEDURE — 2500000002 HC RX 250 W HCPCS SELF ADMINISTERED DRUGS (ALT 637 FOR MEDICARE OP, ALT 636 FOR OP/ED): Performed by: PHYSICAL MEDICINE & REHABILITATION

## 2025-03-22 PROCEDURE — 97162 PT EVAL MOD COMPLEX 30 MIN: CPT | Mod: GP

## 2025-03-22 PROCEDURE — 97530 THERAPEUTIC ACTIVITIES: CPT | Mod: GO

## 2025-03-22 RX ORDER — DOXYCYCLINE 100 MG/1
100 TABLET ORAL 2 TIMES DAILY
COMMUNITY
End: 2025-03-30 | Stop reason: HOSPADM

## 2025-03-22 RX ORDER — VALSARTAN 80 MG/1
320 TABLET ORAL DAILY
Status: DISCONTINUED | OUTPATIENT
Start: 2025-03-22 | End: 2025-03-30 | Stop reason: HOSPADM

## 2025-03-22 RX ORDER — ASCORBIC ACID 250 MG
500 TABLET ORAL 2 TIMES DAILY
Status: DISCONTINUED | OUTPATIENT
Start: 2025-03-22 | End: 2025-03-30 | Stop reason: HOSPADM

## 2025-03-22 RX ORDER — LANOLIN ALCOHOL/MO/W.PET/CERES
500 CREAM (GRAM) TOPICAL
COMMUNITY

## 2025-03-22 RX ORDER — OXYCODONE HYDROCHLORIDE 5 MG/1
10 TABLET ORAL EVERY 4 HOURS PRN
Status: DISCONTINUED | OUTPATIENT
Start: 2025-03-22 | End: 2025-03-30 | Stop reason: HOSPADM

## 2025-03-22 RX ORDER — OXYCODONE HYDROCHLORIDE 5 MG/1
10 TABLET ORAL EVERY 4 HOURS PRN
Status: DISCONTINUED | OUTPATIENT
Start: 2025-03-22 | End: 2025-03-22

## 2025-03-22 RX ORDER — OXYCODONE HYDROCHLORIDE 5 MG/1
5 TABLET ORAL EVERY 4 HOURS PRN
Status: DISCONTINUED | OUTPATIENT
Start: 2025-03-22 | End: 2025-03-30 | Stop reason: HOSPADM

## 2025-03-22 RX ORDER — METHOCARBAMOL 750 MG/1
750 TABLET, FILM COATED ORAL 3 TIMES DAILY PRN
COMMUNITY
End: 2025-03-30 | Stop reason: HOSPADM

## 2025-03-22 RX ORDER — PSYLLIUM HUSK 0.4 G
1 CAPSULE ORAL DAILY
Status: DISCONTINUED | OUTPATIENT
Start: 2025-03-22 | End: 2025-03-30 | Stop reason: HOSPADM

## 2025-03-22 RX ORDER — DOXYCYCLINE 100 MG/1
100 TABLET ORAL 2 TIMES DAILY
Status: DISCONTINUED | OUTPATIENT
Start: 2025-03-22 | End: 2025-03-22 | Stop reason: RX

## 2025-03-22 RX ORDER — ACETAMINOPHEN 325 MG/1
650 TABLET ORAL EVERY 6 HOURS PRN
Status: DISCONTINUED | OUTPATIENT
Start: 2025-03-22 | End: 2025-03-30 | Stop reason: HOSPADM

## 2025-03-22 RX ORDER — DOCUSATE SODIUM 100 MG/1
100 CAPSULE, LIQUID FILLED ORAL 2 TIMES DAILY
Status: DISCONTINUED | OUTPATIENT
Start: 2025-03-22 | End: 2025-03-30 | Stop reason: HOSPADM

## 2025-03-22 RX ORDER — METOPROLOL TARTRATE 50 MG/1
50 TABLET ORAL 2 TIMES DAILY
Status: DISCONTINUED | OUTPATIENT
Start: 2025-03-22 | End: 2025-03-30 | Stop reason: HOSPADM

## 2025-03-22 RX ORDER — DEXTROMETHORPHAN HYDROBROMIDE, GUAIFENESIN 5; 100 MG/5ML; MG/5ML
1000 LIQUID ORAL EVERY 8 HOURS SCHEDULED
COMMUNITY
End: 2025-03-30 | Stop reason: HOSPADM

## 2025-03-22 RX ORDER — HYDRALAZINE HYDROCHLORIDE 50 MG/1
50 TABLET, FILM COATED ORAL AS NEEDED
COMMUNITY

## 2025-03-22 RX ORDER — DOXYCYCLINE HYCLATE 100 MG
100 TABLET ORAL EVERY 12 HOURS SCHEDULED
Status: DISPENSED | OUTPATIENT
Start: 2025-03-22 | End: 2025-03-25

## 2025-03-22 RX ORDER — TALC
3 POWDER (GRAM) TOPICAL NIGHTLY PRN
Status: DISCONTINUED | OUTPATIENT
Start: 2025-03-22 | End: 2025-03-30 | Stop reason: HOSPADM

## 2025-03-22 RX ORDER — CLONAZEPAM 0.5 MG/1
1 TABLET ORAL 2 TIMES DAILY PRN
Status: DISCONTINUED | OUTPATIENT
Start: 2025-03-22 | End: 2025-03-30 | Stop reason: HOSPADM

## 2025-03-22 RX ORDER — HYDRALAZINE HYDROCHLORIDE 50 MG/1
50 TABLET, FILM COATED ORAL AS NEEDED
Status: DISCONTINUED | OUTPATIENT
Start: 2025-03-22 | End: 2025-03-26

## 2025-03-22 RX ORDER — METHOCARBAMOL 750 MG/1
750 TABLET, FILM COATED ORAL 3 TIMES DAILY PRN
Status: DISCONTINUED | OUTPATIENT
Start: 2025-03-22 | End: 2025-03-30 | Stop reason: HOSPADM

## 2025-03-22 RX ORDER — OXYCODONE HYDROCHLORIDE 5 MG/1
5 CAPSULE ORAL
COMMUNITY
End: 2025-03-30 | Stop reason: HOSPADM

## 2025-03-22 RX ADMIN — METOPROLOL TARTRATE 50 MG: 50 TABLET, FILM COATED ORAL at 20:21

## 2025-03-22 RX ADMIN — OXYCODONE HYDROCHLORIDE 5 MG: 5 TABLET ORAL at 14:10

## 2025-03-22 RX ADMIN — OXYCODONE HYDROCHLORIDE 10 MG: 5 TABLET ORAL at 01:11

## 2025-03-22 RX ADMIN — APIXABAN 2.5 MG: 2.5 TABLET, FILM COATED ORAL at 20:20

## 2025-03-22 RX ADMIN — OXYCODONE HYDROCHLORIDE 5 MG: 5 TABLET ORAL at 20:16

## 2025-03-22 RX ADMIN — ACETAMINOPHEN 650 MG: 325 TABLET, FILM COATED ORAL at 08:37

## 2025-03-22 RX ADMIN — APIXABAN 2.5 MG: 2.5 TABLET, FILM COATED ORAL at 08:20

## 2025-03-22 RX ADMIN — DOXYCYCLINE HYCLATE 100 MG: 100 TABLET, COATED ORAL at 06:00

## 2025-03-22 RX ADMIN — Medication 500 MG: at 08:21

## 2025-03-22 RX ADMIN — Medication 500 MG: at 20:20

## 2025-03-22 RX ADMIN — VALSARTAN 320 MG: 80 TABLET, FILM COATED ORAL at 08:19

## 2025-03-22 RX ADMIN — METOPROLOL TARTRATE 50 MG: 50 TABLET, FILM COATED ORAL at 08:20

## 2025-03-22 RX ADMIN — DOXYCYCLINE HYCLATE 100 MG: 100 TABLET, COATED ORAL at 20:20

## 2025-03-22 RX ADMIN — METHOCARBAMOL TABLETS 750 MG: 750 TABLET, COATED ORAL at 08:21

## 2025-03-22 SDOH — ECONOMIC STABILITY: HOUSING INSECURITY: AT ANY TIME IN THE PAST 12 MONTHS, WERE YOU HOMELESS OR LIVING IN A SHELTER (INCLUDING NOW)?: NO

## 2025-03-22 SDOH — SOCIAL STABILITY: SOCIAL INSECURITY: ARE YOU OR HAVE YOU BEEN THREATENED OR ABUSED PHYSICALLY, EMOTIONALLY, OR SEXUALLY BY ANYONE?: NO

## 2025-03-22 SDOH — SOCIAL STABILITY: SOCIAL INSECURITY: WITHIN THE LAST YEAR, HAVE YOU BEEN HUMILIATED OR EMOTIONALLY ABUSED IN OTHER WAYS BY YOUR PARTNER OR EX-PARTNER?: NO

## 2025-03-22 SDOH — SOCIAL STABILITY: SOCIAL INSECURITY
WITHIN THE LAST YEAR, HAVE YOU BEEN RAPED OR FORCED TO HAVE ANY KIND OF SEXUAL ACTIVITY BY YOUR PARTNER OR EX-PARTNER?: NO

## 2025-03-22 SDOH — ECONOMIC STABILITY: INCOME INSECURITY: IN THE PAST 12 MONTHS HAS THE ELECTRIC, GAS, OIL, OR WATER COMPANY THREATENED TO SHUT OFF SERVICES IN YOUR HOME?: NO

## 2025-03-22 SDOH — HEALTH STABILITY: PHYSICAL HEALTH
HOW OFTEN DO YOU NEED TO HAVE SOMEONE HELP YOU WHEN YOU READ INSTRUCTIONS, PAMPHLETS, OR OTHER WRITTEN MATERIAL FROM YOUR DOCTOR OR PHARMACY?: RARELY

## 2025-03-22 SDOH — ECONOMIC STABILITY: FOOD INSECURITY: HOW HARD IS IT FOR YOU TO PAY FOR THE VERY BASICS LIKE FOOD, HOUSING, MEDICAL CARE, AND HEATING?: NOT HARD AT ALL

## 2025-03-22 SDOH — SOCIAL STABILITY: SOCIAL INSECURITY
WITHIN THE LAST YEAR, HAVE YOU BEEN KICKED, HIT, SLAPPED, OR OTHERWISE PHYSICALLY HURT BY YOUR PARTNER OR EX-PARTNER?: NO

## 2025-03-22 SDOH — ECONOMIC STABILITY: FOOD INSECURITY: WITHIN THE PAST 12 MONTHS, THE FOOD YOU BOUGHT JUST DIDN'T LAST AND YOU DIDN'T HAVE MONEY TO GET MORE.: NEVER TRUE

## 2025-03-22 SDOH — ECONOMIC STABILITY: HOUSING INSECURITY: IN THE PAST 12 MONTHS, HOW MANY TIMES HAVE YOU MOVED WHERE YOU WERE LIVING?: 1

## 2025-03-22 SDOH — ECONOMIC STABILITY: TRANSPORTATION INSECURITY: IN THE PAST 12 MONTHS, HAS LACK OF TRANSPORTATION KEPT YOU FROM MEDICAL APPOINTMENTS OR FROM GETTING MEDICATIONS?: NO

## 2025-03-22 SDOH — ECONOMIC STABILITY: HOUSING INSECURITY: IN THE LAST 12 MONTHS, WAS THERE A TIME WHEN YOU WERE NOT ABLE TO PAY THE MORTGAGE OR RENT ON TIME?: NO

## 2025-03-22 SDOH — SOCIAL STABILITY: SOCIAL INSECURITY: HAS ANYONE EVER THREATENED TO HURT YOUR FAMILY OR YOUR PETS?: NO

## 2025-03-22 SDOH — SOCIAL STABILITY: SOCIAL INSECURITY: WITHIN THE LAST YEAR, HAVE YOU BEEN AFRAID OF YOUR PARTNER OR EX-PARTNER?: NO

## 2025-03-22 SDOH — SOCIAL STABILITY: SOCIAL INSECURITY: WERE YOU ABLE TO COMPLETE ALL THE BEHAVIORAL HEALTH SCREENINGS?: YES

## 2025-03-22 SDOH — SOCIAL STABILITY: SOCIAL INSECURITY: HAVE YOU HAD ANY THOUGHTS OF HARMING ANYONE ELSE?: NO

## 2025-03-22 SDOH — SOCIAL STABILITY: SOCIAL INSECURITY: DO YOU FEEL ANYONE HAS EXPLOITED OR TAKEN ADVANTAGE OF YOU FINANCIALLY OR OF YOUR PERSONAL PROPERTY?: NO

## 2025-03-22 SDOH — ECONOMIC STABILITY: FOOD INSECURITY: WITHIN THE PAST 12 MONTHS, YOU WORRIED THAT YOUR FOOD WOULD RUN OUT BEFORE YOU GOT THE MONEY TO BUY MORE.: NEVER TRUE

## 2025-03-22 SDOH — SOCIAL STABILITY: SOCIAL INSECURITY: DO YOU FEEL UNSAFE GOING BACK TO THE PLACE WHERE YOU ARE LIVING?: NO

## 2025-03-22 SDOH — SOCIAL STABILITY: SOCIAL INSECURITY: DOES ANYONE TRY TO KEEP YOU FROM HAVING/CONTACTING OTHER FRIENDS OR DOING THINGS OUTSIDE YOUR HOME?: NO

## 2025-03-22 SDOH — SOCIAL STABILITY: SOCIAL INSECURITY: HAVE YOU HAD THOUGHTS OF HARMING ANYONE ELSE?: NO

## 2025-03-22 SDOH — SOCIAL STABILITY: SOCIAL INSECURITY: ARE THERE ANY APPARENT SIGNS OF INJURIES/BEHAVIORS THAT COULD BE RELATED TO ABUSE/NEGLECT?: NO

## 2025-03-22 SDOH — SOCIAL STABILITY: SOCIAL INSECURITY: ABUSE: ADULT

## 2025-03-22 ASSESSMENT — COGNITIVE AND FUNCTIONAL STATUS - GENERAL
DRESSING REGULAR LOWER BODY CLOTHING: A LITTLE
TOILETING: A LOT
HELP NEEDED FOR BATHING: A LOT
DAILY ACTIVITIY SCORE: 16
MOVING TO AND FROM BED TO CHAIR: A LOT
STANDING UP FROM CHAIR USING ARMS: A LOT
DRESSING REGULAR UPPER BODY CLOTHING: A LITTLE
TURNING FROM BACK TO SIDE WHILE IN FLAT BAD: A LITTLE
PERSONAL GROOMING: A LOT
WALKING IN HOSPITAL ROOM: TOTAL
CLIMB 3 TO 5 STEPS WITH RAILING: TOTAL
MOBILITY SCORE: 13

## 2025-03-22 ASSESSMENT — PAIN DESCRIPTION - ORIENTATION: ORIENTATION: RIGHT

## 2025-03-22 ASSESSMENT — PAIN - FUNCTIONAL ASSESSMENT
PAIN_FUNCTIONAL_ASSESSMENT: 0-10

## 2025-03-22 ASSESSMENT — BRIEF INTERVIEW FOR MENTAL STATUS (BIMS)
WHAT DAY OF THE WEEK IS IT: CORRECT
WHAT MONTH IS IT: ACCURATE WITHIN 5 DAYS
WHAT YEAR IS IT: CORRECT
ASKED TO RECALL SOCK: YES, NO CUE REQUIRED
INITIAL REPETITION OF BED BLUE SOCK - FIRST ATTEMPT: 3
BIMS SUMMARY SCORE: 13
GENERAL FUNCTIONAL COGNITION RATING: INDEPENDENT
ASKED TO RECALL BLUE: YES, NO CUE REQUIRED
ASKED TO RECALL BED: NO, COULD NOT RECALL

## 2025-03-22 ASSESSMENT — LIFESTYLE VARIABLES
HOW OFTEN DO YOU HAVE 6 OR MORE DRINKS ON ONE OCCASION: NEVER
HOW OFTEN DO YOU HAVE A DRINK CONTAINING ALCOHOL: NEVER
AUDIT-C TOTAL SCORE: 0
AUDIT-C TOTAL SCORE: 0
HOW MANY STANDARD DRINKS CONTAINING ALCOHOL DO YOU HAVE ON A TYPICAL DAY: PATIENT DOES NOT DRINK
SKIP TO QUESTIONS 9-10: 1

## 2025-03-22 ASSESSMENT — PAIN SCALES - GENERAL
PAINLEVEL_OUTOF10: 4
PAINLEVEL_OUTOF10: 8
PAINLEVEL_OUTOF10: 3
PAINLEVEL_OUTOF10: 3
PAINLEVEL_OUTOF10: 9
PAINLEVEL_OUTOF10: 4
PAINLEVEL_OUTOF10: 7
PAINLEVEL_OUTOF10: 6
PAINLEVEL_OUTOF10: 8
PAINLEVEL_OUTOF10: 4

## 2025-03-22 ASSESSMENT — ACTIVITIES OF DAILY LIVING (ADL)
HOME_MANAGEMENT_TIME_ENTRY: 20
LACK_OF_TRANSPORTATION: NO
BATHING_ASSISTANCE: MAXIMAL
LACK_OF_TRANSPORTATION: NO
ASSISTIVE_DEVICE: WALKER;EYEGLASSES

## 2025-03-22 ASSESSMENT — PAIN SCALES - PAIN ASSESSMENT IN ADVANCED DEMENTIA (PAINAD): TOTALSCORE: MEDICATION (SEE MAR)

## 2025-03-22 ASSESSMENT — PAIN DESCRIPTION - LOCATION: LOCATION: KNEE

## 2025-03-22 ASSESSMENT — PATIENT HEALTH QUESTIONNAIRE - PHQ9
SUM OF ALL RESPONSES TO PHQ9 QUESTIONS 1 & 2: 0
1. LITTLE INTEREST OR PLEASURE IN DOING THINGS: NOT AT ALL
2. FEELING DOWN, DEPRESSED OR HOPELESS: NOT AT ALL
2. FEELING DOWN, DEPRESSED OR HOPELESS: NOT AT ALL
1. LITTLE INTEREST OR PLEASURE IN DOING THINGS: NOT AT ALL
SUM OF ALL RESPONSES TO PHQ9 QUESTIONS 1 & 2: 0

## 2025-03-22 ASSESSMENT — COLUMBIA-SUICIDE SEVERITY RATING SCALE - C-SSRS
2. HAVE YOU ACTUALLY HAD ANY THOUGHTS OF KILLING YOURSELF?: NO
1. IN THE PAST MONTH, HAVE YOU WISHED YOU WERE DEAD OR WISHED YOU COULD GO TO SLEEP AND NOT WAKE UP?: NO
6. HAVE YOU EVER DONE ANYTHING, STARTED TO DO ANYTHING, OR PREPARED TO DO ANYTHING TO END YOUR LIFE?: NO

## 2025-03-22 NOTE — H&P
Admission diagnosis:    History Of Present Illness  Jen Daly is a 75 y.o. female presenting with a right total knee replacement on 3/18 at [facility].  Postoperatively she felt very weak like her right leg was going to give out on her.  She did have multiple falls after her left knee replacement in November 2024.  She also had some mild generalized redness of her right knee and was started on doxycycline for this.  She was hospitalized for 3 days and had a pain infusion, and is having difficulty with pain control while ambulating.  She is admitted for rehabilitation to improve her weightbearing status, pain management, as well as for strengthening and range of motion exercises.     Prior Functional Status:  Lives with Spouse and son, 24 hr assistance available, 1st floor set-up is available   Self-Care: IND w/ADLs  Ambulation: MOD IND w/cane  Stairs: 1 RHEA without rail  Cognition: alert and oriented x 3  Wheelchair: none  Devices: hand held shower, shower chair, cane, WW, reacher, BSC, rollator, grab bars in shower and around toilet     Current Functional Status:  Eating: IND  Oral hygiene: SET UP  Toileting: MOD  Bathing: unknown  Upper body dressing: MIN  Lower body dressing: MAX to don Depends  Bed Mobility: MIN assist to move right LE to L side of bed, sit to stand MOD, stand to sit MOD  Transfers: MIN x 1, use of gait belt, cues for hand placement, ambulated 12' x 1 MIN assist x 1 with decreased candence and step pattern, use of gait belt and chair follow, difficulty changing direction/turning, step length decreased, non-functional gait speed, UE weight bearing on assistive device excessive, foot clearing decreased on right, right weight bearing decreased, knee stability during stance phase decreased, knee flexion during stance increased  Bladder and Bowel: urinary incontinence, wears Depends  Cognition: alert and oriented x 3     Rehabilitation Goals and Plan:  Expected level of improvement: MOD  IND  Likelihood of reaching these goals: good.  Therapy treatments needed to achieve these goals: physical therapy, occupational therapy, nursing, aide, and CM .  Barriers to achieving these goals: Pain   Expected length of stay: 5-7 day(s)         Past Medical History  She has hypertension, her blood pressures are currently elevated.  And she is on Eliquis for DVT prophylaxis    Surgical History  She has had a cholecystectomy and a left total knee replacement in 2024.  She still has some pain and weakness in the left knee     Social History  She reports that she has never smoked. She has never used smokeless tobacco. She reports that she does not drink alcohol and does not use drugs.  She will return home with her  and son to assist     Allergies  Aspirin and Salicylates    Family history:  She does not know the cause of death of her mother but she  in her 80s, her father  of cancer    Medications  Current Facility-Administered Medications   Medication Dose Route Frequency Provider Last Rate Last Admin    acetaminophen (Tylenol) tablet 650 mg  650 mg oral q6h PRN Sandra Christianson, DO   650 mg at 25 0837    apixaban (Eliquis) tablet 2.5 mg  2.5 mg oral BID Sandra Christianson, DO   2.5 mg at 25 0820    ascorbic acid (Vitamin C) tablet 500 mg  500 mg oral BID Sandra FRANKO Christianson, DO   500 mg at 25 0821    calcium carbonate-vitamin D3 500 mg-5 mcg (200 unit) per tablet 1 tablet  1 tablet oral Daily Sandra Christianson DO        clonazePAM (KlonoPIN) tablet 1 mg  1 mg oral BID PRN Sandra Christianson DO        docusate sodium (Colace) capsule 100 mg  100 mg oral BID Sandra Christianson, DO        doxycycline (Vibra-Tabs) tablet 100 mg  100 mg oral q12h JUAQUIN Sandra Christianson DO   100 mg at 25 0600    hydrALAZINE (Apresoline) tablet 50 mg  50 mg oral PRN Sandra Christianson, DO        melatonin tablet 3 mg  3 mg oral Nightly PRN Sandra Christianson, DO        methocarbamol (Robaxin) tablet  750 mg  750 mg oral TID PRN Sandra Christianson DO   750 mg at 03/22/25 0821    metoprolol tartrate (Lopressor) tablet 50 mg  50 mg oral BID Sandra Christianson DO   50 mg at 03/22/25 0820    oxyCODONE (Roxicodone) immediate release tablet 10 mg  10 mg oral q4h PRN Alanis Soni MD        oxyCODONE (Roxicodone) immediate release tablet 5 mg  5 mg oral q4h PRN Alanis Soni MD        valsartan (Diovan) tablet 320 mg  320 mg oral Daily Sandra Christianson DO   320 mg at 03/22/25 0819        Labs  Admission on 03/21/2025   Component Date Value Ref Range Status    WBC 03/22/2025 10.4  4.4 - 11.3 x10*3/uL Final    nRBC 03/22/2025 0.0  0.0 - 0.0 /100 WBCs Final    RBC 03/22/2025 3.79 (L)  4.00 - 5.20 x10*6/uL Final    Hemoglobin 03/22/2025 10.2 (L)  12.0 - 16.0 g/dL Final    Hematocrit 03/22/2025 34.2 (L)  36.0 - 46.0 % Final    MCV 03/22/2025 90  80 - 100 fL Final    MCH 03/22/2025 26.9  26.0 - 34.0 pg Final    MCHC 03/22/2025 29.8 (L)  32.0 - 36.0 g/dL Final    RDW 03/22/2025 13.8  11.5 - 14.5 % Final    Platelets 03/22/2025 310  150 - 450 x10*3/uL Final    Glucose 03/22/2025 102 (H)  74 - 99 mg/dL Final    Sodium 03/22/2025 141  136 - 145 mmol/L Final    Potassium 03/22/2025 4.0  3.5 - 5.3 mmol/L Final    Chloride 03/22/2025 104  98 - 107 mmol/L Final    Bicarbonate 03/22/2025 31  21 - 32 mmol/L Final    Anion Gap 03/22/2025 10  10 - 20 mmol/L Final    Urea Nitrogen 03/22/2025 14  6 - 23 mg/dL Final    Creatinine 03/22/2025 0.53  0.50 - 1.05 mg/dL Final    eGFR 03/22/2025 >90  >60 mL/min/1.73m*2 Final    Calculations of estimated GFR are performed using the 2021 CKD-EPI Study Refit equation without the race variable for the IDMS-Traceable creatinine methods.  https://jasn.asnjournals.org/content/early/2021/09/22/ASN.5888680386    Calcium 03/22/2025 8.4 (L)  8.6 - 10.3 mg/dL Final        Last Recorded Vitals  Blood pressure 163/72, pulse 86, temperature 36.3 °C (97.3 °F), temperature source  "Temporal, resp. rate 18, height 1.549 m (5' 1\"), weight 87 kg (191 lb 12.8 oz), SpO2 93%.      Review of Systems   Constitutional:  Negative for chills, fatigue and fever.   HENT:  Negative for congestion, ear discharge, ear pain and hearing loss.    Eyes negative for vision changes  Respiratory:  Negative for chest tightness, shortness of breath and wheezing.    Cardiovascular:  Negative for chest pain, palpitations and leg swelling.   Gastrointestinal:  Negative for abdominal distention, blood in stool, constipation and diarrhea.   Endocrine: Negative for polydipsia and polyphagia.   Genitourinary:  Negative for difficulty urinating, dysuria, frequency   Musculoskeletal:  Negative for back pain, myalgias and neck stiffness, she still has pain in the left knee and the right knee when ambulating the pain is a 9.   Skin:  Negative for color change, rash and wound.   Neurological:  Negative for dizziness, seizures, numbness and headaches.   Hematological:  Negative for adenopathy. Does not bruise/bleed easily.   Psychiatric/Behavioral:  Negative for confusion and hallucinations. The patient is not nervous/anxious.        Physical Exam  Constitutional:       General: she is not in acute distress.     Appearance: Normal appearance.   HENT:      Head: Normocephalic.      Nose: Nose normal.     Eyes:      Extraocular Movements: Extraocular movements intact.      Conjunctiva/sclera: Conjunctivae normal.   Cardiovascular:      Rate and Rhythm: Normal rate and regular rhythm.      Heart sounds: No murmur heard.     No gallop.   Pulmonary:      Breath sounds: Normal breath sounds. No wheezing, rhonchi or rales.   Abdominal:      General: Abdomen is flat. Bowel sounds are normal. There is no distension.      Palpations: Abdomen is soft.      Tenderness: There is no abdominal tenderness. There is no guarding.   Musculoskeletal:         General: No swelling, tenderness or deformity.   The left knee incision is intact without " erythema  Right knee social mild generalized redness, there is an Aquacel dressing over the knee  Homans' sign is negative bilaterally  Skin:     General: Skin is warm and dry.      Findings: No rash.   Neurological:      General: No focal deficit present.      Mental Status: she is alert and oriented to person, place, and time.      Cranial Nerves: No cranial nerve deficit.   Strength at the right knee is 4- out of 5  Psychiatric:         Mood and Affect: Mood normal.      Assessment/Plan   Assessment & Plan  History of total right knee replacement (TKR)      1.  Right total knee replacement  Patient was hospitalized for 3 days following her knee replacement because of uncontrolled pain.  She is still having high levels of pain when she attempts to stand.  Will begin physical therapy for strengthening, gait training, transfer training, and exercises to improve balance, and knee range of motion and strengthening  Begin occupational therapy for ADL retraining, gait training, and ADL activities.    2.  Uncontrolled pain  She can take 5 to 10 mg of Roxicodone every 4 hours as needed  Robaxin-750 milligrams p.o. 3 times daily  Will monitor for adequate pain control    3.  Mild generalized redness of the right knee  She is currently on doxycycline    4.  Hypertension  Lopressor 50 mg twice daily  Dioval on 320 p.o. daily  Hydralazine 50 mg as needed for elevated BP  Blood pressure this a.m. is elevated at 163/72 this will be rechecked and if it remains high we will adjust medication as needed    5.  DVT prophylaxis  Eliquis 2.5 mg twice daily    6.  Nutrition   Vitamin C 500 mg twice daily     7.  Anxiety  Klonopin 1 mg twice daily as needed    8. FENGI  She is on a bowel program to promote regular bowel movement  She is on Eliquis for DVT prophylaxis  She has a fall risk, will monitor right knee for any evidence of developing cellulitis    Physician Physical Assessment/ Post admission Evaluation (TEA)     I have  reviewed the preadmission rehabilitation screening. There have been no relevant changes since the preadmission screening;    Rehab Plan of Care   The Interdisciplinary Team will work collaboratively to address the patient problems and goals to be managed by the Individualized Interdisciplinary Plan of Care. See the IPOC note for a complete review of the plan of care.    Medical Necessity:  Jen Daly requires, and is capable of participating in, an intensive and coordinated interdisciplinary acute inpatient rehabilitation program. She requires close rehabilitation physician monitoring and management to monitor her complex medical conditions and coordinate rehabilitation care. The patient's rehabilitation goals and medical complexity cannot adequately be managed in a less intensive setting. Potential risks for clinical complications include: Falls, cellulitis, DVT.    Medical Prognosis:  Excellent for continued progress and participation with therapy.    Time spent with record review, exam and documentation is 58 minutes      Sandra Christianson DO

## 2025-03-22 NOTE — CARE PLAN
The patient's goals for the shift include      The clinical goals for the shift include To remain free of falls

## 2025-03-22 NOTE — PROGRESS NOTES
Occupational Therapy    Evaluation    Patient Name: Jen Daly  MRN: [id number]  Department: [facility]  Room: [address] [place] [address]  Today's Date: 3/22/2025  Time Calculation  Start Time: 0830  Stop Time: 0915  Time Calculation (min): 45 min        Assessment:  OT Assessment: Impaired ADLs, transfers, and mobility  Prognosis: Good  Evaluation/Treatment Tolerance: Patient limited by pain  End of Session Communication: Bedside nurse  End of Session Patient Position: Up in chair, Alarm on  OT Assessment Results: Decreased ADL status, Decreased endurance, Decreased functional mobility, Decreased IADLs  Prognosis: Good  Evaluation/Treatment Tolerance: Patient limited by pain  Strengths: Housing layout  Barriers to Participation: Coping skills    Plan:  Treatment Interventions: ADL retraining, UE strengthening/ROM, Functional transfer training, Endurance training, Patient/family training, Equipment evaluation/education  OT Frequency: 90 min/5 days per week (x 1 week)  OT Discharge Recommendations:  (anticipate need for sba/cga for transfers, mobility, ADLs; intermittent assist for higher level IADLs)  Equipment Recommended upon Discharge: Wheeled walker (3:1 commode, shower seat, sock aid, reacher)  Treatment Interventions: ADL retraining, UE strengthening/ROM, Functional transfer training, Endurance training, Patient/family training, Equipment evaluation/education    Subjective     General:  General  Reason for Referral: OT eval/tx following elective right TKR 3/18/25  Referred By: Alanis Carcamo  Past Medical History Relevant to Rehab: HTN, heart murmur, OA, anxiety, L TKR Nov 2024, severe obesity, multiple falls due to R knee buckling following L TKR  Prior to Session Communication: Bedside nurse  Patient Position Received: Alarm on, Bed, 2 rail up  General Comment: patient is pleasant and cooperative, however highly anxious and requiring much encourgement throughout eval    Precautions:  LE Weight Bearing Status: Weight  Bearing as Tolerated  Medical Precautions: Fall precautions  Post-Surgical Precautions: Right total knee precautions            Pain:  Pain Assessment  Pain Assessment: 0-10  0-10 (Numeric) Pain Score:  (2/10 at rest; increases to 9/10 with activity)  Pain Location:  (right knee)  Pain Interventions: Cold pack, Repositioned, Emotional support    Objective   Cognition:  Overall Cognitive Status: Within Functional Limits (anxious, requires much encouragement)  Orientation Level: Oriented X4           Home Living:  Type of Home: House (Ranch style house with basement (patient does not access); 1+1 entry steps, no rail; laundry on 1st floor)  Lives With:  (spouse and son; spouse retired and in good health; son works from home)  Bathroom Shower/Tub: Walk-in shower (grab bar, shower seat)  Bathroom Toilet: Standard (has 3:1 commode)    Prior Function:  Level of [place]:  (patient required some assistance for LE dressing from spouse prior to admit; I for transfers and mobility using wheeled walker; spouse and son manage homemaking; patient and spouse both drive; owns st cane and reacher)  Hand Dominance: Right    ADL:  Eating Assistance: Independent  Grooming Assistance: Stand by  Bathing Assistance: Maximal  UE Dressing Assistance: Stand by  LE Dressing Assistance: Total  Toileting Assistance with Device: Total    Activity Tolerance:  Endurance: Decreased tolerance for upright activites    Bed Mobility/Transfers: Bed Mobility  Bed Mobility:  (mod assist supine to sit; increased time and effort to complete)    Transfers  Transfer:  (min/mod assist x2 sit to stand from bed; cues for safe hand placement and technique)      Functional Mobility:  Functional Mobility  Functional Mobility Performed:  (min assist x2 for short distance mobility (6') via wheeled walker; needs much encouragement to continue task, somewhat self-limiting; requires max cues for safe technique; anxious)    Sitting Balance:  Static Sitting  Balance  Static Sitting-Level of Assistance: Close supervision  Dynamic Sitting Balance  Dynamic Sitting-Level of Assistance: Contact guard    Standing Balance:  Static Standing Balance  Static Standing-Level of Assistance: Moderate assistance        Vision:Vision - Basic Assessment  Current Vision: Wears glasses all the time      Sensation:  Light Touch: No apparent deficits    Hand Function:  Gross Grasp: Functional  Coordination: Functional    Extremities: RUE   RUE : Within Functional Limits    LUE   LUE: Within Functional Limits    Education Documentation  Handouts, taught by Bijal Guzmán OT at 3/22/2025 12:05 PM.  Learner: Patient  Readiness: Acceptance  Method: Explanation  Response: Verbalizes Understanding, Needs Reinforcement    Body Mechanics, taught by Bijal Guzmán OT at 3/22/2025 12:05 PM.  Learner: Patient  Readiness: Acceptance  Method: Explanation  Response: Verbalizes Understanding, Needs Reinforcement    Precautions, taught by Bijal Guzmán OT at 3/22/2025 12:05 PM.  Learner: Patient  Readiness: Acceptance  Method: Explanation  Response: Verbalizes Understanding, Needs Reinforcement    Home Exercise Program, taught by Bijal Guzmán OT at 3/22/2025 12:05 PM.  Learner: Patient  Readiness: Acceptance  Method: Explanation  Response: Verbalizes Understanding, Needs Reinforcement    ADL Training, taught by Bijal Guzmán OT at 3/22/2025 12:05 PM.  Learner: Patient  Readiness: Acceptance  Method: Explanation  Response: Verbalizes Understanding, Needs Reinforcement    Education Comments  No comments found.        EDUCATION:  Education  Individual(s) Educated: Patient  Education Provided: POC discussed and agreed upon, Fall precautons, Risk and benefits of OT discussed with patient or other  Plan of Care Discussed and Agreed Upon: yes  Patient Response to Education: Patient/Caregiver Verbalized Understanding of Information  Education Comment: Patient declines any information at this  time on intimacy/sex in regards to diagnosis.    Goals:  Encounter Problems       Encounter Problems (Active)       OT Problem       LTG - Patient will complete grooming independently.  (Progressing)       Start:  03/22/25    Expected End:  03/29/25            LTG - Patient will complete toileting with sba. (Progressing)       Start:  03/22/25    Expected End:  03/29/25            LTG - Patient will complete bathing with sba. (Progressing)       Start:  03/22/25    Expected End:  03/29/25            LTG - Patient will complete UE dressing with set up. (Progressing)       Start:  03/22/25    Expected End:  03/29/25            LTG - Patient will complete LE dressing using adaptive equip as needed with sba. (Progressing)       Start:  03/22/25    Expected End:  03/29/25            LTG - Patient will complete commode transfer via device as needed with sba. (Progressing)       Start:  03/22/25    Expected End:  03/29/25            LTG - Patient will complete shower transfer using DME as needed with cga. (Progressing)       Start:  03/22/25    Expected End:  03/29/25            LTG - Patient will complete functional mobility via device as needed with sba. (Progressing)       Start:  03/22/25    Expected End:  03/29/25

## 2025-03-22 NOTE — INDIVIDUALIZED OVERALL PLAN OF CARE NOTE
Individualized Plan of Care:     Primary rehabilitation diagnosis: Right total knee replacement     [facility] code:  8.61     Estimated length of stay:   7 days     Medical functional prognosis is good to be discharged home at a modified independent to supervised level       Patient/family anticipated outcome:  Home as independent as possible     Functional outcome/goal:  Bed mobility: Modified independent  Transfer sit to stand : Modified independent  Ambulation: Supervised  Upper extremity dressing: Modified independent  Lower extremity dressing: Supervised  Stairs: Contact-guard to min assist  communicate needs and wants as independent as able.       Anticipated interventions:  Therapeutic exercises  Gait Training  Transfer training  Exercises to improve balance and mobility  ADL retraining for grooming, bathing, ADL activities  Exercises to improve strength and endurance            Required therapy:  Physical therapy 90 minutes 5 days/week.  Occupational therapy 90 minutes 5 days/week.       Patient has good potential to be discharged home in a modified independent to supervised level in 7 days.  She is having problems with uncontrolled pain which is being addressed.  She has a history of multiple falls after her left knee replacement and some issues with her right knee attempting to buckle.  Will begin right knee strengthening and she is a fall risk.  She is on doxycycline for some generalized redness of the right knee and will monitor for cellulitis.  She will be seen by a physician to address her multiple medical problems.    Individualized physician plan of care  1.  Right total knee replacement  Patient was hospitalized for 3 days following her knee replacement because of uncontrolled pain.  She is still having high levels of pain when she attempts to stand.  Will begin physical therapy for strengthening, gait training, transfer training, and exercises to improve balance, and knee range of motion and  strengthening  Begin occupational therapy for ADL retraining, gait training, and ADL activities.     2.  Uncontrolled pain  She can take 5 to 10 mg of Roxicodone every 4 hours as needed  Robaxin-750 milligrams p.o. 3 times daily  Will monitor for adequate pain control     3.  Mild generalized redness of the right knee  She is currently on doxycycline     4.  Hypertension  Lopressor 50 mg twice daily  Dioval on 320 p.o. daily  Hydralazine 50 mg as needed for elevated BP  Blood pressure this a.m. is elevated at 163/72 this will be rechecked and if it remains high we will adjust medication as needed     5.  DVT prophylaxis  Eliquis 2.5 mg twice daily     6.  Nutrition   Vitamin C 500 mg twice daily  7.  Anxiety  Klonopin 1 mg twice daily as needed     8. FENGI  She is on a bowel program to promote regular bowel movement  She is on Eliquis for DVT prophylaxis  She has a fall risk, will monitor right knee for any evidence of developing cellulitis

## 2025-03-22 NOTE — PROGRESS NOTES
Physical Therapy    Physical Therapy Treatment    Patient Name: Jen Daly  MRN: [id number]  Department: [facility] REHAB  Room: [address] [place], [place] [address]  Today's Date: 3/22/2025  Time Calculation  Start Time: 1045  Stop Time: 1130  Time Calculation (min): 45 min         Assessment/Plan   PT Assessment  PT Assessment Results: Decreased strength, Decreased endurance, Impaired balance, Decreased mobility, Decreased coordination, Decreased range of motion, Pain, Obesity, Orthopedic restrictions  Rehab Prognosis: Good  Barriers to Discharge Home: Physical needs  Physical Needs: Stair navigation into home limited by function/safety  Evaluation/Treatment Tolerance: Patient limited by pain, Patient limited by fatigue  Medical Staff Made Aware: Yes  Strengths: Support of Caregivers, Rehab experience, Living arrangement secure, Housing layout  Barriers to Participation: Comorbidities, Coping skills, Premorbid level of function  End of Session Communication: Bedside nurse  End of Session Patient Position: Up in chair, Alarm on     PT Plan  Treatment/Interventions: Bed mobility, Transfer training, Gait training, Balance training, Neuromuscular re-education, Strengthening, Endurance training, Therapeutic exercise, Therapeutic activity, Home exercise program, Stair training  PT Plan: Ongoing PT  PT Frequency: 90 min/5 days per week (for 1 week)  PT Discharge Recommendations: Other (comment) (Anticipate discharge home at walker level with recommendaton for SBA with intermittent assist for high level tasks)  Equipment Recommended upon Discharge: Wheeled walker  PT Recommended Transfer Status: Assist x2      General Visit Information:   PT  Visit  PT Received On: 03/22/25  General  Reason for Referral: PT eval and treat following elective R TKR on 3/18/25.  Referred By: Dr. Christianson  Past Medical History Relevant to Rehab: PMH: HTN, OA, Anxiety, Colon CA, Heart murmur, Bradycardia, obesity, L TKR 11/2024  Prior to Session Communication: Bedside  nurse  Patient Position Received: Alarm on, Up in chair  General Comment: highly anxious, tearful at times.    Subjective   Precautions:  Precautions  LE Weight Bearing Status: Weight Bearing as Tolerated  Medical Precautions: Fall precautions  Post-Surgical Precautions: Right total knee precautions    Objective   Pain:  Pain Assessment  Pain Assessment: 0-10  0-10 (Numeric) Pain Score: 7  Pain Interventions: Repositioned, Emotional support, Distraction, Cold applied  Response to Interventions: No change in pain    Treatments:  Ambulation/Gait Training  Ambulation/Gait Training Performed:  (Pt amb 13' with FWW and min A x 2 plus wc follow, cues for step to pattern, pt is fearful of R knee buckling, but no buckling observed by therapist. Pt is self limiting, requires encouragement to continue ambulation.)  Transfers  Transfer:  (Repeated sit/stand transfers pulling up on bed rail with min/mod A x 2 for change of under garment due to incontinent of urine. Sit/stand transfer from wc to walker with min A x 2.)    Education Documentation  Body Mechanics, taught by Linn Marquez PT at 3/22/2025 11:59 AM.  Learner: Patient  Readiness: Acceptance  Method: Explanation  Response: Verbalizes Understanding    Mobility Training, taught by Linn Marquez PT at 3/22/2025 11:59 AM.  Learner: Patient  Readiness: Acceptance  Method: Explanation  Response: Verbalizes Understanding    Education Comments  Pt educated on techniques for transfers and gait training with device in order to maximize safety and independence.       Encounter Problems       Encounter Problems (Active)       PT Problem       LTG - Pt will perform bed mobility with SBA  (Progressing)       Start:  03/22/25    Expected End:  03/29/25            LTG - Pt will perform transfers with SBA.  (Progressing)       Start:  03/22/25    Expected End:  03/29/25            LTG - Pt will amb 30 feet with FWW and SBA.   (Progressing)       Start:  03/22/25    Expected  End:  03/29/25            LTG - Pt will up/down 1+1 stairs with device and min/mod A x 2 (Progressing)       Start:  03/22/25    Expected End:  03/29/25

## 2025-03-22 NOTE — PROGRESS NOTES
Physical Therapy    Physical Therapy Evaluation    Patient Name: Jen Daly  MRN: [id number]  Department: [facility] REHAB  Room: [address] [place], [place] [address]  Today's Date: 3/22/2025   Time Calculation  Start Time: 0830  Stop Time: 0915  Time Calculation (min): 45 min    Assessment/Plan   PT Assessment  PT Assessment Results: Decreased strength, Decreased endurance, Impaired balance, Decreased mobility, Decreased coordination, Decreased range of motion, Pain, Obesity, Orthopedic restrictions  Rehab Prognosis: Good  Barriers to Discharge Home: Physical needs  Physical Needs: Stair navigation into home limited by function/safety  Evaluation/Treatment Tolerance: Patient limited by pain, Patient limited by fatigue  Medical Staff Made Aware: Yes  Strengths: Support of Caregivers, Rehab experience, Living arrangement secure, Housing layout  Barriers to Participation: Comorbidities, Coping skills, Premorbid level of function  End of Session Communication: Bedside nurse  End of Session Patient Position: Up in chair, Alarm on  IP OR SWING BED PT PLAN  Inpatient or Swing Bed: Inpatient  PT Plan  Treatment/Interventions: Bed mobility, Transfer training, Gait training, Balance training, Neuromuscular re-education, Strengthening, Endurance training, Therapeutic exercise, Therapeutic activity, Home exercise program, Stair training  PT Plan: Ongoing PT  PT Frequency: 90 min/5 days per week (for 1 week)  PT Discharge Recommendations: Other (comment) (Anticipate discharge home at walker level with recommendaton for SBA with intermittent assist for high level tasks)  Equipment Recommended upon Discharge: Wheeled walker  PT Recommended Transfer Status: Assist x2    Subjective   General Visit Information:  General  Reason for Referral: PT eval and treat following elective R TKR on 3/18/25.  Referred By: Dr. Christianson  Past Medical History Relevant to Rehab: PMH: HTN, OA, Anxiety, Colon CA, Heart murmur, Bradycardia, obesity, L TKR 11/2024  Prior to Session  Communication: Bedside nurse  Patient Position Received: Bed, 2 rail up  General Comment: Highly anxious bc she has had 3 falls since L TKR in Nov.  Home Living:  Home Living  Type of Home: House  Lives With:  (Spouse, retired, son, works from home.)  Home Adaptive Equipment:  (FWW, S/C.)  Home Layout:  (1 story home with bed/bath/laundry on 1st floor. Pt does not access basement.)  Home Access:  (front entrance: 1+1 steps, no rails.)  Prior Level of Function:  Prior Function Per Pt/Caregiver Report  Receives Help From:  (family manages home making tasks. Family helps with LE dressing.)  Prior Function Comments: PTA pt was independent with household mobility and transfers with FWW at all times. Pt states she has a non-traditional way of negotiating stairs with 2 person assist, but unable to explain it to therapist at this time.  Precautions:  Precautions  LE Weight Bearing Status: Weight Bearing as Tolerated  Medical Precautions: Fall precautions  Post-Surgical Precautions: Right total knee precautions      Objective   Pain:  Pain Assessment  Pain Assessment: 0-10  0-10 (Numeric) Pain Score: 9 (R knee 2/10 at rest, with mobility states pain inc to 9/10. Pain meds requested prior to eval but not given. Requested again during eval and was given Tylenol.)  Pain Interventions: Repositioned, Distraction, Emotional support, Medication (See MAR), Cold applied  Response to Interventions: No change in pain  Cognition:  Cognition  Overall Cognitive Status: Within Functional Limits  Orientation Level: Disoriented X4    General Assessments:  Activity Tolerance  Endurance: Decreased tolerance for upright activites    Sensation  Sensation Comment: denies numbness/tingling at time of eval, reports intermittent LE numbness.    Strength  Strength Comments: LLE: hip flex 4, knee flex/ext 4, ankle DF/PF 4. RLE: hip flex 2-, knee ext 2+, knee flex 3, ankle DF/PF 4-  Strength  Strength Comments: LLE: hip flex 4, knee flex/ext 4, ankle  DF/PF 4. RLE: hip flex 2-, knee ext 2+, knee flex 3, ankle DF/PF 4-  Functional Assessments:  Bed Mobility  Bed Mobility:  (supine > sit with min A to slide RLE off EOB, but mod A to scoot to get feet onto floor.)    Transfers  Transfer:  (Sit/stand transfers with min/mod A x 2, but highly fearful and anxious)    Ambulation/Gait Training  Ambulation/Gait Training Performed:  (Pt amb 6' with FWW and min A x 2, slow, effortful, cues for sequencing, and requires encouragement to continue. Pt very worried about R knee buckling, but not evidence of buckling observed by therapist.)  Extremity/Trunk Assessments:  RLE   RLE :  (R knee ROM: 15 deg ext to 85 deg flexion in sitting. Otherwise AROM WFL)  LLE   LLE :  (AROM WFL)  Encounter Problems       Encounter Problems (Active)       PT Problem       LTG - Pt will perform bed mobility with SBA  (Progressing)       Start:  03/22/25    Expected End:  03/29/25            LTG - Pt will perform transfers with SBA.  (Progressing)       Start:  03/22/25    Expected End:  03/29/25            LTG - Pt will amb 30 feet with FWW and SBA.   (Progressing)       Start:  03/22/25    Expected End:  03/29/25            LTG - Pt will up/down 1+1 stairs with device and min/mod A x 2 (Progressing)       Start:  03/22/25    Expected End:  03/29/25                   Education Documentation  Body Mechanics, taught by Linn Marquez PT at 3/22/2025 11:59 AM.  Learner: Patient  Readiness: Acceptance  Method: Explanation  Response: Verbalizes Understanding    Mobility Training, taught by Linn Marquez PT at 3/22/2025 11:59 AM.  Learner: Patient  Readiness: Acceptance  Method: Explanation  Response: Verbalizes Understanding    Education Comments  Pt educated on rehab processes, treatment interventions, and goals.

## 2025-03-22 NOTE — PROGRESS NOTES
Occupational Therapy    OT Treatment    Patient Name: Jen Daly  MRN: [id number]  Department: [facility] REHAB  Room: [address] [place] [address]  Today's Date: 3/22/2025  Time Calculation  Start Time: 1300  Stop Time: 1345  Time Calculation (min): 45 min        Assessment:  OT Assessment: Impaired ADLs, transfers, and mobility  Prognosis: Good  Evaluation/Treatment Tolerance: Patient limited by pain  End of Session Communication: Bedside nurse  End of Session Patient Position: Up in chair, Alarm on  OT Assessment Results: Decreased ADL status, Decreased endurance, Decreased functional mobility, Decreased IADLs  Prognosis: Good  Evaluation/Treatment Tolerance: Patient limited by pain  Strengths: Housing layout  Barriers to Participation: Coping skills    Plan:  Treatment Interventions: ADL retraining, UE strengthening/ROM, Functional transfer training, Endurance training, Patient/family training, Equipment evaluation/education  OT Frequency: 90 min/5 days per week (x 1 week)  OT Discharge Recommendations:  (anticipate need for sba/cga for transfers, mobility, ADLs; intermittent assist for higher level IADLs)  Equipment Recommended upon Discharge: Wheeled walker (3:1 commode, shower seat, sock aid, reacher)  Treatment Interventions: ADL retraining, UE strengthening/ROM, Functional transfer training, Endurance training, Patient/family training, Equipment evaluation/education    Subjective   Previous Visit Info:  OT Last Visit  OT Received On: 03/22/25    General:  General  Reason for Referral: OT eval/tx secondary to to Right TKR 3/18/25  Referred By: Alanis Carcamo  Past Medical History Relevant to Rehab: HTN, heart murmur, OA, anxiety, L TKR Nov 2024, severe obesity, multiple falls due to R knee buckling following L TKR  Prior to Session Communication: Bedside nurse  Patient Position Received: Up in chair, Alarm on  General Comment: highly anxious, needs frequent encouragement    Precautions:  LE Weight Bearing Status: Weight Bearing  as Tolerated  Medical Precautions: Fall precautions  Post-Surgical Precautions: Right total knee precautions            Pain:  Pain Assessment  Pain Assessment: 0-10  0-10 (Numeric) Pain Score:  (4/10 at rest; 8/10 with movement/activity)  Pain Type: Surgical pain  Pain Location: Knee  Pain Orientation: Right  Pain Interventions: Repositioned, Emotional support (requested pain meds at end of session, notified nursing)  Response to Interventions: No change in pain    Objective    Cognition:  Cognition  Overall Cognitive Status: Within Functional Limits (anxious, requires much encouragement)  Orientation Level: Oriented X4       Activities of Daily Living: LE Dressing  LE Dressing: Yes  Sock Level of Assistance:  (instructed patient on adaptive equip for donning/doffing socks; doffs socks with dressing stick with sba and mod cues for technique; dons socks with sock aid with mod assist and max cues for technnique)    Functional Standing Tolerance:  Functional Standing Tolerance Comments: static standing at walker for 1 minute x 2 trials with min assist; encouragement throughout to maintain standing, anxious/fearful    Bed Mobility/Transfers: Transfers  Transfer:  (mod assist sit to stand from wheelchair, step by step cues for safe technique/hand placement)        EDUCATION:  Education  Individual(s) Educated: Patient  Education Provided:  (patient educated on walker safety with good understanding; also educated on adaptive equip for le dressing with fair understanding/follow through - will benefit from further education)    Goals:  Encounter Problems       Encounter Problems (Active)       OT Problem       LTG - Patient will complete grooming independently.  (Progressing)       Start:  03/22/25    Expected End:  03/29/25            LTG - Patient will complete toileting with sba. (Progressing)       Start:  03/22/25    Expected End:  03/29/25            LTG - Patient will complete bathing with sba. (Progressing)        Start:  03/22/25    Expected End:  03/29/25            LTG - Patient will complete UE dressing with set up. (Progressing)       Start:  03/22/25    Expected End:  03/29/25            LTG - Patient will complete LE dressing using adaptive equip as needed with sba. (Progressing)       Start:  03/22/25    Expected End:  03/29/25            LTG - Patient will complete commode transfer via device as needed with sba. (Progressing)       Start:  03/22/25    Expected End:  03/29/25            LTG - Patient will complete shower transfer using DME as needed with cga. (Progressing)       Start:  03/22/25    Expected End:  03/29/25            LTG - Patient will complete functional mobility via device as needed with sba. (Progressing)       Start:  03/22/25    Expected End:  03/29/25

## 2025-03-23 PROCEDURE — 2500000002 HC RX 250 W HCPCS SELF ADMINISTERED DRUGS (ALT 637 FOR MEDICARE OP, ALT 636 FOR OP/ED): Performed by: PHYSICAL MEDICINE & REHABILITATION

## 2025-03-23 PROCEDURE — 2500000005 HC RX 250 GENERAL PHARMACY W/O HCPCS: Performed by: PHYSICAL MEDICINE & REHABILITATION

## 2025-03-23 PROCEDURE — 2500000001 HC RX 250 WO HCPCS SELF ADMINISTERED DRUGS (ALT 637 FOR MEDICARE OP): Performed by: PHYSICAL MEDICINE & REHABILITATION

## 2025-03-23 PROCEDURE — 1180000001 HC REHAB PRIVATE ROOM DAILY

## 2025-03-23 RX ADMIN — APIXABAN 2.5 MG: 2.5 TABLET, FILM COATED ORAL at 20:42

## 2025-03-23 RX ADMIN — DOXYCYCLINE HYCLATE 100 MG: 100 TABLET, COATED ORAL at 20:44

## 2025-03-23 RX ADMIN — METOPROLOL TARTRATE 50 MG: 50 TABLET, FILM COATED ORAL at 09:18

## 2025-03-23 RX ADMIN — DOXYCYCLINE HYCLATE 100 MG: 100 TABLET, COATED ORAL at 09:18

## 2025-03-23 RX ADMIN — OXYCODONE HYDROCHLORIDE 5 MG: 5 TABLET ORAL at 08:02

## 2025-03-23 RX ADMIN — OXYCODONE HYDROCHLORIDE 5 MG: 5 TABLET ORAL at 15:27

## 2025-03-23 RX ADMIN — METOPROLOL TARTRATE 50 MG: 50 TABLET, FILM COATED ORAL at 20:42

## 2025-03-23 RX ADMIN — VALSARTAN 320 MG: 80 TABLET, FILM COATED ORAL at 09:19

## 2025-03-23 RX ADMIN — Medication 3 MG: at 20:44

## 2025-03-23 RX ADMIN — CLONAZEPAM 1 MG: 0.5 TABLET ORAL at 08:03

## 2025-03-23 RX ADMIN — HYDRALAZINE HYDROCHLORIDE 50 MG: 50 TABLET ORAL at 05:10

## 2025-03-23 RX ADMIN — OXYCODONE HYDROCHLORIDE 10 MG: 5 TABLET ORAL at 20:42

## 2025-03-23 RX ADMIN — Medication 500 MG: at 20:42

## 2025-03-23 RX ADMIN — APIXABAN 2.5 MG: 2.5 TABLET, FILM COATED ORAL at 09:19

## 2025-03-23 ASSESSMENT — PAIN SCALES - GENERAL
PAINLEVEL_OUTOF10: 4
PAINLEVEL_OUTOF10: 0 - NO PAIN
PAINLEVEL_OUTOF10: 3
PAINLEVEL_OUTOF10: 5 - MODERATE PAIN
PAINLEVEL_OUTOF10: 2
PAINLEVEL_OUTOF10: 7
PAINLEVEL_OUTOF10: 0 - NO PAIN

## 2025-03-23 ASSESSMENT — PAIN - FUNCTIONAL ASSESSMENT
PAIN_FUNCTIONAL_ASSESSMENT: 0-10

## 2025-03-23 ASSESSMENT — PAIN DESCRIPTION - ORIENTATION
ORIENTATION: RIGHT

## 2025-03-23 ASSESSMENT — PAIN DESCRIPTION - LOCATION
LOCATION: KNEE
LOCATION: KNEE

## 2025-03-23 ASSESSMENT — PAIN DESCRIPTION - DESCRIPTORS: DESCRIPTORS: DULL

## 2025-03-23 NOTE — CARE PLAN
Problem: Skin  Goal: Participates in plan/prevention/treatment measures  Outcome: Progressing  Goal: Prevent/manage excess moisture  Outcome: Progressing  Goal: Prevent/minimize sheer/friction injuries  Outcome: Progressing  Goal: Promote/optimize nutrition  Outcome: Progressing  Goal: Promote skin healing  Outcome: Progressing     Problem: Pain  Goal: Walks with improved pain control throughout the shift  Outcome: Progressing  Goal: Performs ADL's with improved pain control throughout shift  Outcome: Progressing  Goal: Participates in PT with improved pain control throughout the shift  Outcome: Progressing  Goal: Free from opioid side effects throughout the shift  Outcome: Progressing  Goal: Free from acute confusion related to pain meds throughout the shift  Outcome: Progressing   The patient's goals for the shift include      The clinical goals for the shift include To remain free of falls

## 2025-03-23 NOTE — CARE PLAN
The patient's goals for the shift include proper control of pain to a tolerable level .    The clinical goals for the shift include To remain free of falls    Problem: Skin  Goal: Participates in plan/prevention/treatment measures  Outcome: Progressing  Goal: Prevent/manage excess moisture  Outcome: Progressing  Goal: Prevent/minimize sheer/friction injuries  Outcome: Progressing  Goal: Promote/optimize nutrition  Outcome: Progressing  Goal: Promote skin healing  Outcome: Progressing     Problem: Pain  Goal: Walks with improved pain control throughout the shift  Outcome: Progressing  Goal: Performs ADL's with improved pain control throughout shift  Outcome: Progressing  Goal: Participates in PT with improved pain control throughout the shift  Outcome: Progressing  Goal: Free from opioid side effects throughout the shift  Outcome: Progressing  Goal: Free from acute confusion related to pain meds throughout the shift  Outcome: Progressing

## 2025-03-23 NOTE — CARE PLAN
Problem: Skin  Goal: Participates in plan/prevention/treatment measures  Outcome: Progressing  Goal: Prevent/manage excess moisture  Outcome: Progressing  Goal: Prevent/minimize sheer/friction injuries  3/23/2025 1401 by Vesta Platt RN  Flowsheets (Taken 3/23/2025 1401)  Prevent/minimize sheer/friction injuries: Use pull sheet  3/23/2025 1352 by Vesta Platt RN  Outcome: Progressing  Goal: Promote/optimize nutrition  Outcome: Progressing  Goal: Promote skin healing  Outcome: Progressing     Problem: Pain  Goal: Walks with improved pain control throughout the shift  Outcome: Progressing  Goal: Performs ADL's with improved pain control throughout shift  Outcome: Progressing  Goal: Participates in PT with improved pain control throughout the shift  Outcome: Progressing  Goal: Free from opioid side effects throughout the shift  Outcome: Progressing  Goal: Free from acute confusion related to pain meds throughout the shift  Outcome: Progressing   The patient's goals for the shift include      The clinical goals for the shift include To remain free of falls

## 2025-03-23 NOTE — PROGRESS NOTES
"Jen Daly is a 75 y.o. female on day 2 of admission presenting with History of total right knee replacement (TKR).    Subjective   Patient is anxious because she believes she has an appointment tomorrow with her orthopedic surgeon and it is not documented in her discharge paperwork.  Her son is going to call and confirm the appointment and I did instruct him to keep the appointment if it has been made.  There is mild redness and swelling at the knee and her surgeon should be the one to remove the  dressing.  Other than this she feels fine she denies chest pain or shortness of breath.  10 review of systems are negative       Objective     Physical Exam  Constitutional:       General: no acute distress.     Appearance: Normal appearance.   HENT:      Head: Normocephalic.  Cardiovascular:   S1-S2 no gallops  Pulmonary:   Lungs are clear throughout all fields  Abdominal:   Abdomen is soft bowel sounds are active  Musculoskeletal:      Right knee incision is mildly swollen, the redness has improved but some redness is still present  Homans' sign is negative bilaterally    Skin:     General: Skin is warm and dry.      Findings: No rash.   Neurological:      General: No focal deficit present.      Mental Status:  alert and oriented to person, place, and time.      Cranial Nerves: No cranial nerve deficit.      Sit to stand transfers are min to mod assist of 2    Last Recorded Vitals  Blood pressure 131/58, pulse 88, temperature 35.7 °C (96.3 °F), temperature source Temporal, resp. rate 18, height 1.549 m (5' 1\"), weight 87 kg (191 lb 12.8 oz), SpO2 95%.  Intake/Output last 3 Shifts:  No intake/output data recorded.    Relevant Results  Scheduled medications  apixaban, 2.5 mg, oral, BID  ascorbic acid, 500 mg, oral, BID  calcium carbonate-vitamin D3, 1 tablet, oral, Daily  docusate sodium, 100 mg, oral, BID  doxycylcine, 100 mg, oral, q12h JUAQUIN  metoprolol tartrate, 50 mg, oral, BID  valsartan, 320 mg, oral, " Daily      Continuous medications     PRN medications  PRN medications: acetaminophen, clonazePAM, hydrALAZINE, melatonin, methocarbamol, oxyCODONE, oxyCODONE     No results found for this or any previous visit (from the past 24 hours).         Assessment/Plan   Assessment & Plan  History of total right knee replacement (TKR)    Redness and swelling of knee        1.  Right total knee replacement  Patient was hospitalized for 3 days following her knee replacement because of uncontrolled pain.  She is still having high levels of pain when she attempts to stand.  Will begin physical therapy for strengthening, gait training, transfer training, and exercises to improve balance, and knee range of motion and strengthening  Begin occupational therapy for ADL retraining, gait training, and ADL activities.     2.  Uncontrolled pain  She can take 5 to 10 mg of Roxicodone every 4 hours as needed  Robaxin-750 milligrams p.o. 3 times daily  Will monitor for adequate pain control     3.  Mild generalized redness of the right knee  She is currently on doxycycline     4.  Hypertension  Lopressor 50 mg twice daily  Dioval on 320 p.o. daily  Hydralazine 50 mg as needed for elevated BP  Blood pressure this a.m. is elevated at 163/72 this will be rechecked and if it remains high we will adjust medication as needed     5.  DVT prophylaxis  Eliquis 2.5 mg twice daily     6.  Nutrition   Vitamin C 500 mg twice daily  7.  Anxiety  Klonopin 1 mg twice daily as needed     8. FENGI  She is on a bowel program to promote regular bowel movement  She is on Eliquis for DVT prophylaxis  She has a fall risk, will monitor right knee for any evidence of developing cellulitis     3/23  Sit to stand transfers are min to mod assist of 2  Patient to follow-up with surgeon for removal of the dressing, family has been instructed to keep the appointment if it has been set up they will check in the morning  She continues on DVT prophylaxis and Homans' sign is  negative bilaterally  H&H on 3/22 is 10 and 34, white blood cell count is 10.4         I spent 39 minutes in the professional and overall care of this patient.      Sandra Christianson, DO

## 2025-03-24 PROCEDURE — 97116 GAIT TRAINING THERAPY: CPT | Mod: GP,CQ

## 2025-03-24 PROCEDURE — 2500000002 HC RX 250 W HCPCS SELF ADMINISTERED DRUGS (ALT 637 FOR MEDICARE OP, ALT 636 FOR OP/ED): Performed by: PHYSICAL MEDICINE & REHABILITATION

## 2025-03-24 PROCEDURE — 97110 THERAPEUTIC EXERCISES: CPT | Mod: GP,CQ

## 2025-03-24 PROCEDURE — 2500000001 HC RX 250 WO HCPCS SELF ADMINISTERED DRUGS (ALT 637 FOR MEDICARE OP): Performed by: PHYSICAL MEDICINE & REHABILITATION

## 2025-03-24 PROCEDURE — 97535 SELF CARE MNGMENT TRAINING: CPT | Mod: GO

## 2025-03-24 PROCEDURE — 97530 THERAPEUTIC ACTIVITIES: CPT | Mod: GP,CQ

## 2025-03-24 PROCEDURE — 2500000005 HC RX 250 GENERAL PHARMACY W/O HCPCS: Performed by: PHYSICAL MEDICINE & REHABILITATION

## 2025-03-24 PROCEDURE — 1180000001 HC REHAB PRIVATE ROOM DAILY

## 2025-03-24 RX ADMIN — DOXYCYCLINE HYCLATE 100 MG: 100 TABLET, COATED ORAL at 21:34

## 2025-03-24 RX ADMIN — VALSARTAN 320 MG: 80 TABLET, FILM COATED ORAL at 08:21

## 2025-03-24 RX ADMIN — METOPROLOL TARTRATE 50 MG: 50 TABLET, FILM COATED ORAL at 21:32

## 2025-03-24 RX ADMIN — CLONAZEPAM 1 MG: 0.5 TABLET ORAL at 21:33

## 2025-03-24 RX ADMIN — Medication 3 MG: at 21:32

## 2025-03-24 RX ADMIN — DOXYCYCLINE HYCLATE 100 MG: 100 TABLET, COATED ORAL at 08:22

## 2025-03-24 RX ADMIN — APIXABAN 2.5 MG: 2.5 TABLET, FILM COATED ORAL at 21:30

## 2025-03-24 RX ADMIN — OXYCODONE HYDROCHLORIDE 5 MG: 5 TABLET ORAL at 15:51

## 2025-03-24 RX ADMIN — METOPROLOL TARTRATE 50 MG: 50 TABLET, FILM COATED ORAL at 08:21

## 2025-03-24 RX ADMIN — OXYCODONE HYDROCHLORIDE 10 MG: 5 TABLET ORAL at 08:23

## 2025-03-24 RX ADMIN — HYDRALAZINE HYDROCHLORIDE 50 MG: 50 TABLET ORAL at 16:43

## 2025-03-24 RX ADMIN — APIXABAN 2.5 MG: 2.5 TABLET, FILM COATED ORAL at 08:22

## 2025-03-24 ASSESSMENT — PAIN DESCRIPTION - ORIENTATION
ORIENTATION: RIGHT
ORIENTATION: RIGHT

## 2025-03-24 ASSESSMENT — PAIN SCALES - GENERAL
PAINLEVEL_OUTOF10: 4
PAINLEVEL_OUTOF10: 5 - MODERATE PAIN
PAINLEVEL_OUTOF10: 0 - NO PAIN
PAINLEVEL_OUTOF10: 0 - NO PAIN
PAINLEVEL_OUTOF10: 6

## 2025-03-24 ASSESSMENT — PAIN - FUNCTIONAL ASSESSMENT
PAIN_FUNCTIONAL_ASSESSMENT: 0-10

## 2025-03-24 ASSESSMENT — ACTIVITIES OF DAILY LIVING (ADL): HOME_MANAGEMENT_TIME_ENTRY: 45

## 2025-03-24 ASSESSMENT — PAIN DESCRIPTION - LOCATION
LOCATION: KNEE
LOCATION: KNEE

## 2025-03-24 NOTE — PROGRESS NOTES
Physical Therapy    Physical Therapy Treatment    Patient Name: Jen Daly  MRN: [id number]  Department: [facility] REHAB  Room: [address] [place] [address]  Today's Date: 3/24/2025  Time Calculation  Start Time: 1430  Stop Time: 1515  Time Calculation (min): 45 min         Assessment/Plan   PT Assessment  End of Session Communication: Bedside nurse  End of Session Patient Position: Bed, 2 rail up, Alarm on (Supine with HOB elevated, call bell in reach.)     PT Plan  Treatment/Interventions: Bed mobility, Transfer training, Gait training, Balance training, Neuromuscular re-education, Strengthening, Endurance training, Therapeutic exercise, Therapeutic activity, Home exercise program, Stair training  PT Plan: Ongoing PT  PT Frequency: 90 min/5 days per week (for 1 week)  PT Discharge Recommendations: Other (comment) (Anticipate discharge home at walker level with recommendaton for SBA with intermittent assist for high level tasks)  Equipment Recommended upon Discharge: Wheeled walker  PT Recommended Transfer Status: Assist x2      General Visit Information:   PT  Visit  PT Received On: 03/24/25  General  Patient Position Received: Bed, 2 rail up, Alarm on  General Comment:  (Pt pleasant and agreeable to participate in PT session. BLE SCD's donned/activated pre/post session.)    Subjective   Precautions:  Precautions  LE Weight Bearing Status: Weight Bearing as Tolerated  Medical Precautions: Fall precautions  Post-Surgical Precautions: Right total knee precautions  Precautions Comment: RLE WBAT,Falls     Date/Time Vitals Session Patient Position Pulse Resp SpO2 BP MAP (mmHg)    03/24/25 16:22:20 --  --  71  16  95 %  183/73  105                 Objective   Pain:  Pain Assessment  Pain Assessment: 0-10  0-10 (Numeric) Pain Score: 4  Pain Type: Surgical pain  Pain Location: Knee  Pain Orientation: Right  Pain Interventions: Repositioned, Distraction              Treatments:  Therapeutic Exercise  Therapeutic Exercise Performed: Yes (Pt  "performed seated BLE therex consisting of: heel/toe raises and marching (R AAROM) supported on 4\" step, LAQ, HS, GS, hip ADD isometrics with ball, ABD 2x10 reps each in order to improve strength and functional mobility.)    Bed Mobility  Bed Mobility: Yes (Pt performed supine>sit with HOB elevated and scooting to EOB with Min/ModAx1 and sit>supine with Min/ModAx1 to assist with BLE .)    Ambulation/Gait Training  Ambulation/Gait Training Performed: Yes (Pt able to amb 16'x1 and ~3' w/c>EOB with FWW and MinAx2 plus w/c follow; demos slow, antalgic gait pattern.  Denies dizziness and no LOB noted.)  Transfers  Transfer: Yes (Pt performed sit<>stand from EOB and w/c with FWW and MinAx2; v/c for proper hand placement safety.)    Education Documentation  No documentation found.  Education Comments  Patient educated in correct bed mobility with instruction for proper trunk positioning, upper and lower body placement and positioning and use of bed rail if appropriate.  Patient educated in safe and proper transfer techniques including proper positioning and hand/foot placement to maximize safety and functional independence.  Patient educated in safe techniques for gait with a device in order to maximize safety and functional independence.  Pt educated on therex, including optimal techniques to maximize function.          OP EDUCATION:       Encounter Problems       Encounter Problems (Active)       PT Problem       LTG - Pt will perform bed mobility with SBA  (Progressing)       Start:  03/22/25    Expected End:  03/29/25            LTG - Pt will perform transfers with SBA.  (Progressing)       Start:  03/22/25    Expected End:  03/29/25            LTG - Pt will amb 30 feet with FWW and SBA.   (Progressing)       Start:  03/22/25    Expected End:  03/29/25            LTG - Pt will up/down 1+1 stairs with device and min/mod A x 2 (Progressing)       Start:  03/22/25    Expected End:  03/29/25                   "

## 2025-03-24 NOTE — PROGRESS NOTES
3/24/25:  Spoke with patient bedside, introduced self and explained role.  Patient [place]  [place]  Her  is retired and her son works from home.  Prior to admission the patient was independent with a walker in the home.  Discussed ELOS depends on progress and goals, will continue to update regarding DC planning. -Alexei Menchaca RN    3/28/25:  Patient's  and son here today for therapy training.  They are agreeable to DC Jin 3/30.  She will follow up with outpatient PT. She also has a follow up with her surgeon on Monday 3/31.  -Alexei Menchaca RN

## 2025-03-24 NOTE — PROGRESS NOTES
Occupational Therapy    OT Treatment    Patient Name: Jen Daly  MRN: [id number]  Department: [facility] REHAB  Room: [address] [place] [address]  Today's Date: 3/24/2025  Time Calculation  Start Time: 0745  Stop Time: 0830  Time Calculation (min): 45 min        Assessment:  End of Session Communication: Bedside nurse  End of Session Patient Position: Up in chair, Alarm on (call light in reach, all needs met)     Plan:  Treatment Interventions: ADL retraining, UE strengthening/ROM, Functional transfer training, Endurance training, Patient/family training, Equipment evaluation/education  OT Frequency: 90 min/5 days per week (x 1 week)  OT Discharge Recommendations:  (anticipate need for sba/cga for transfers, mobility, ADLs; intermittent assist for higher level IADLs)  Equipment Recommended upon Discharge: Wheeled walker (3:1 commode, shower seat, sock aid, reacher)  Treatment Interventions: ADL retraining, UE strengthening/ROM, Functional transfer training, Endurance training, Patient/family training, Equipment evaluation/education    Subjective   Previous Visit Info:  OT Last Visit  OT Received On: 03/24/25  General:  General  Patient Position Received: Bed, 2 rail up, Alarm on  General Comment: patient pleasant and cooperative, however, anxious and tearful throughout, requires encouragement to participate in session  Precautions:  LE Weight Bearing Status: Weight Bearing as Tolerated  Medical Precautions: Fall precautions  Post-Surgical Precautions: Right total knee precautions            Pain:  Pain Assessment  Pain Assessment:  (reports 6/10 pain at surgical site. RN notified and medicated during session. repositioned EOS for comfort)    Objective         Activities of Daily Living: Grooming  Grooming Level of Assistance: Close supervision  Grooming Where Assessed: Sitting sinkside  Grooming Comments: to brush teeth    UE Bathing  UE Bathing Comments: declines bathing tasks this date, reporting she wants to get everything done as  quick as possible to get to her appointment. educated patient upon 0830  time, continues to decline bathing    UE Dressing  UE Dressing Comments: declines to complete UB dressing, reporting nursing assisted her with donning shirt this AM    LE Dressing  LE Dressing Adaptive Equipment: Reacher  Pants Level of Assistance: Moderate assistance  Sock Level of Assistance:  (declines to complete)  LE Dressing Where Assessed: Wheelchair (standing at bed rail when donning over waist/hips)    Toileting  Toileting Level of Assistance: Moderate assistance  Where Assessed: Toilet (commode chair over toilet)  Toileting Comments: able to complete anterior letty care seated, standing with grab bar, requires assist for clothing management    Bed Mobility/Transfers: Bed Mobility  Bed Mobility:  (completed supine to sit with MOD A x 1 with HOB elevated and use of bed rail)    Transfers  Transfer: Yes  Transfer 1  Trials/Comments 1: completed SPT from EOB  to w/c with MIN-MOD A with use of WW and max cues throughout for proper body mechanics  Transfers 2  Trials/Comments 2: also completing STS at bed rail with MIN A x 1    Toilet transfer: SPT from W/C<--> commode chair over toilet and raising/lowering from commode chair over toilet. overall, completing with MIN-MOD A x 1 with use of wall grab bar         Education Documentation  Body Mechanics, taught by Yin Melendez OT at 3/24/2025  9:15 AM.  Learner: Patient  Readiness: Acceptance  Method: Explanation  Response: Verbalizes Understanding, Needs Reinforcement    Precautions, taught by Yin Melendez OT at 3/24/2025  9:15 AM.  Learner: Patient  Readiness: Acceptance  Method: Explanation  Response: Verbalizes Understanding, Needs Reinforcement    ADL Training, taught by Yin Melendez OT at 3/24/2025  9:15 AM.  Learner: Patient  Readiness: Acceptance  Method: Explanation  Response: Verbalizes Understanding, Needs Reinforcement    Education Comments  No comments  found.        OP EDUCATION:       Goals:  Encounter Problems       Encounter Problems (Active)       OT Problem       LTG - Patient will complete grooming independently.  (Progressing)       Start:  03/22/25    Expected End:  03/29/25            LTG - Patient will complete toileting with sba. (Progressing)       Start:  03/22/25    Expected End:  03/29/25            LTG - Patient will complete bathing with sba. (Progressing)       Start:  03/22/25    Expected End:  03/29/25            LTG - Patient will complete UE dressing with set up. (Progressing)       Start:  03/22/25    Expected End:  03/29/25            LTG - Patient will complete LE dressing using adaptive equip as needed with sba. (Progressing)       Start:  03/22/25    Expected End:  03/29/25            LTG - Patient will complete commode transfer via device as needed with sba. (Progressing)       Start:  03/22/25    Expected End:  03/29/25            LTG - Patient will complete shower transfer using DME as needed with cga. (Progressing)       Start:  03/22/25    Expected End:  03/29/25            LTG - Patient will complete functional mobility via device as needed with sba. (Progressing)       Start:  03/22/25    Expected End:  03/29/25

## 2025-03-24 NOTE — PROGRESS NOTES
She has an obesity risk of class 2 with a BMI of 36.  She is admitted to rehab for a right total knee replacement.

## 2025-03-24 NOTE — PROGRESS NOTES
Physical Therapy                 Therapy Communication Note    Patient Name: Jen Daly  MRN: [id number]  Department: [facility] REHAB  Room: [facility]  Today's Date: 3/24/2025   Time: 0915    Discipline: Physical Therapy    PT Missed Visit: Yes     Missed Visit Reason: Missed Visit Reason:  (Pt offsite for medical appointment)    Missed Time: Cancel    Comment:

## 2025-03-24 NOTE — CONSULTS
"Nutrition Initial Assessment:   Nutrition Assessment    Reason for Assessment: Admission nursing screening (acute rehab protocol; MST=2 for unsure of weight loss)    Patient is a 75 y.o. female presenting with R TKR 3/18/25    Pmhx: L TKR 11/2024, HTN, cholecystectomy     Nutrition History:  Energy Intake:  (not established yet)  Pain affecting nutrition status: N/A  Food and Nutrient History: Pt was out of room at time of attempted visit today.  Per review of chart, pt ate 75% of breakfast today. Pt is s/p R TKR 3/18, L TKR 11/2024  Vitamin/Herbal Supplement Use: vitamin c, calcium plus D       Anthropometrics:  Height: 154.9 cm (5' 0.98\")   Weight: 87 kg (191 lb 12.8 oz)   BMI (Calculated): 36.26  IBW/kg (Dietitian Calculated): 47.7 kg  Percent of IBW: 182 %       Weight History:     Weight Change %:  Weight History / % Weight Change: 10/31 88.7kg, 5/14 86.7kg, 4/9 87.9kg  Significant Weight Loss: No    Nutrition Focused Physical Exam Findings:    Subcutaneous Fat Loss:   Defer Subcutaneous Fat Loss Assessment: Defer all  Defer All Reason: unavailable  Muscle Wasting:  Defer Muscle Wasting Assessment: Defer all  Defer All Reason: unavailable  Edema:  Edema: +1 trace  Edema Location: generalized, RLE; non pitting LLE  Physical Findings:  Skin: Positive (R knee incision)    Nutrition Significant Labs:  CBC Trend:   Results from last 7 days   Lab Units 03/22/25  0620   WBC AUTO x10*3/uL 10.4   RBC AUTO x10*6/uL 3.79*   HEMOGLOBIN g/dL 10.2*   HEMATOCRIT % 34.2*   MCV fL 90   PLATELETS AUTO x10*3/uL 310    , BMP Trend:   Results from last 7 days   Lab Units 03/22/25  0620   GLUCOSE mg/dL 102*   CALCIUM mg/dL 8.4*   SODIUM mmol/L 141   POTASSIUM mmol/L 4.0   CO2 mmol/L 31   CHLORIDE mmol/L 104   BUN mg/dL 14   CREATININE mg/dL 0.53        Nutrition Specific Medications:  Reviewed     I/O:   Last BM Date: 03/23/25;      Dietary Orders (From admission, onward)       Start     Ordered    03/24/25 1320  Oral nutritional " supplements  Until discontinued        Question Answer Comment   Deliver with Dinner    Select supplement: Ensure High Protein        03/24/25 1319    03/22/25 0437  May Participate in Room Service  ( ROOM SERVICE MAY PARTICIPATE)  Once        Question:  .  Answer:  Yes    03/22/25 0436    03/22/25 0035  Adult diet Regular; Thin 0  Diet effective now        Question Answer Comment   Diet type Regular    Fluid consistency Thin 0        03/22/25 0037                     Estimated Needs:      Method for Estimating Needs: 1300-1430kcals (27-30kcals/kg IBW)     Method for Estimating 24 Hour Protein Needs: 52-62g (1.1-1.3g/kg IBW)     Method for Estimating 24 Hour Fluid Needs: 1 mL/kcal or as per MD  Patient on Order Fluid Restriction: No        Nutrition Diagnosis   Malnutrition Diagnosis  Patient has Malnutrition Diagnosis:  (unable to determine at this time)    Nutrition Diagnosis  Patient has Nutrition Diagnosis: Yes  Diagnosis Status (1): New  Nutrition Diagnosis 1: Increased nutrient needs  Related to (1): physiological causes increasing nutrient needs  As Evidenced by (1): wound healing needs and acute rehab demands       Nutrition Interventions/Recommendations   Nutrition prescription for oral nutrition    Nutrition Recommendations:  Individualized Nutrition Prescription Provided for : Regular diet as ordered. Will order Ensure HP once daily until intakes established    Nutrition Interventions/Goals:   Interventions: Meals and snacks, Medical food supplement  Meals and Snacks: General healthful diet  Goal: consume >=75% of meals  Medical Food Supplement: Commercial beverage medical food supplement therapy  Goal: consume >75% of Ensure HP once daily (for an additional 160 kcals, 16 gm protein each)      Education Documentation  No documentation found.     N/A     Nutrition Monitoring and Evaluation   Food/Nutrient Related History Monitoring  Monitoring and Evaluation Plan: Estimated Energy Intake, Fluid intake,  Intake / amount of food  Estimated Energy Intake: Energy intake greater or equal to 75% of estimated energy needs  Fluid Intake: Estimated fluid intake  Intake / Amount of food: Consumes at least 75% or more of meals/snacks/supplements, Meets > 75% estimated energy needs    Anthropometric Measurements  Monitoring and Evaluation Plan: Body weight  Body Weight: Body weight - Weight reduction from fluids, as needed, Body weight - Maintain stable weight    Biochemical Data, Medical Tests and Procedures  Monitoring and Evaluation Plan: Electrolyte/renal panel  Electrolyte and Renal Panel: Electrolytes within normal limits    Physical Exam Findings  Monitoring and Evaluation Plan: Skin, Edema  Edema Finding: +1 Pitting edema  Other: promote healing through adequate nutrition    Goal Status: New goal(s) identified    Time Spent (min): 30 minutes

## 2025-03-24 NOTE — CARE PLAN
The patient's goals for the shift include      The clinical goals for the shift include the patient will remain pain and injury free throughout the shift    Over the shift, the patient did not make progress toward the following goals. Barriers to progression include weakness. Recommendations to address these barriers include following poc.

## 2025-03-24 NOTE — CARE PLAN
The patient's goals for the shift include      The clinical goals for the shift include NO FALL.    Problem: Skin  Goal: Participates in plan/prevention/treatment measures  Outcome: Progressing  Goal: Prevent/manage excess moisture  Outcome: Progressing  Goal: Prevent/minimize sheer/friction injuries  Outcome: Progressing  Goal: Promote/optimize nutrition  Outcome: Progressing  Goal: Promote skin healing  Outcome: Progressing     Problem: Pain  Goal: Walks with improved pain control throughout the shift  Outcome: Progressing  Goal: Performs ADL's with improved pain control throughout shift  Outcome: Progressing  Goal: Participates in PT with improved pain control throughout the shift  Outcome: Progressing  Goal: Free from opioid side effects throughout the shift  Outcome: Progressing  Goal: Free from acute confusion related to pain meds throughout the shift  Outcome: Progressing

## 2025-03-24 NOTE — PROGRESS NOTES
"Jen Daly is a 75 y.o. female on day 3 of admission presenting with History of total right knee replacement (TKR).    Subjective   Patient is going to see her surgeon today, she denies chest pain or shortness of breath.  10 review of systems are negative       Objective     Physical Exam  Constitutional:       General: no acute distress.     Appearance: Normal appearance.   Cardiovascular:   Regular rate and rhythm  Pulmonary:   Lungs are clear  without adventitious lung sounds  Abdominal:   Abdomen is soft bowel sounds are active, no rigidity  Musculoskeletal:      Right knee incision is mildly swollen, the redness has improved but slight redness is still present  Homans' sign is negative bilaterally    Skin:     General: Skin is warm and dry.      Findings: No rash.   Neurological:      General: No focal deficit present.      Mental Status:  alert and oriented to person, place, and time.      Cranial Nerves: No cranial nerve deficit.      Sit to stand transfers to wheelchair to walker are min assist of 2    Last Recorded Vitals  Blood pressure (!) 192/81, pulse 90, temperature 35.9 °C (96.6 °F), temperature source Temporal, resp. rate 15, height 1.549 m (5' 1\"), weight 87 kg (191 lb 12.8 oz), SpO2 94%.  Intake/Output last 3 Shifts:  No intake/output data recorded.    Relevant Results  Scheduled medications  apixaban, 2.5 mg, oral, BID  ascorbic acid, 500 mg, oral, BID  calcium carbonate-vitamin D3, 1 tablet, oral, Daily  docusate sodium, 100 mg, oral, BID  doxycylcine, 100 mg, oral, q12h JUAQUIN  metoprolol tartrate, 50 mg, oral, BID  valsartan, 320 mg, oral, Daily      Continuous medications     PRN medications  PRN medications: acetaminophen, clonazePAM, hydrALAZINE, melatonin, methocarbamol, oxyCODONE, oxyCODONE     No results found for this or any previous visit (from the past 24 hours).         Assessment/Plan   Assessment & Plan  History of total right knee replacement (TKR)    Redness and swelling of " knee        1.  Right total knee replacement  Patient was hospitalized for 3 days following her knee replacement because of uncontrolled pain.  She is still having high levels of pain when she attempts to stand.  Will begin physical therapy for strengthening, gait training, transfer training, and exercises to improve balance, and knee range of motion and strengthening  Begin occupational therapy for ADL retraining, gait training, and ADL activities.     2.  Uncontrolled pain  She can take 5 to 10 mg of Roxicodone every 4 hours as needed  Robaxin-750 milligrams p.o. 3 times daily  Will monitor for adequate pain control     3.  Mild generalized redness of the right knee  She is currently on doxycycline     4.  Hypertension  Lopressor 50 mg twice daily  Dioval on 320 p.o. daily  Hydralazine 50 mg as needed for elevated BP  Blood pressure this a.m. is elevated at 163/72 this will be rechecked and if it remains high we will adjust medication as needed     5.  DVT prophylaxis  Eliquis 2.5 mg twice daily     6.  Nutrition   Vitamin C 500 mg twice daily  7.  Anxiety  Klonopin 1 mg twice daily as needed     8. FENGI  She is on a bowel program to promote regular bowel movement  She is on Eliquis for DVT prophylaxis  She has a fall risk, will monitor right knee for any evidence of developing cellulitis     3/24  Sit to stand transfer from wheelchair to walker are min assist of 2  H&H on 3/22 is 10 and 34, white blood cell count is 10.4  BP is 192/81, she is very anxious, will adjust medications as needed  Continue Eliquis she is tolerating it well       I spent 27 minutes in the professional and overall care of this patient.      Sandra Christianson,

## 2025-03-25 PROCEDURE — 2500000001 HC RX 250 WO HCPCS SELF ADMINISTERED DRUGS (ALT 637 FOR MEDICARE OP): Performed by: PHYSICAL MEDICINE & REHABILITATION

## 2025-03-25 PROCEDURE — 97530 THERAPEUTIC ACTIVITIES: CPT | Mod: GP

## 2025-03-25 PROCEDURE — 2500000005 HC RX 250 GENERAL PHARMACY W/O HCPCS: Performed by: PHYSICAL MEDICINE & REHABILITATION

## 2025-03-25 PROCEDURE — 1180000001 HC REHAB PRIVATE ROOM DAILY

## 2025-03-25 PROCEDURE — 97110 THERAPEUTIC EXERCISES: CPT | Mod: GP

## 2025-03-25 PROCEDURE — 97110 THERAPEUTIC EXERCISES: CPT | Mod: GO

## 2025-03-25 PROCEDURE — 97530 THERAPEUTIC ACTIVITIES: CPT | Mod: GO

## 2025-03-25 PROCEDURE — 2500000002 HC RX 250 W HCPCS SELF ADMINISTERED DRUGS (ALT 637 FOR MEDICARE OP, ALT 636 FOR OP/ED): Performed by: PHYSICAL MEDICINE & REHABILITATION

## 2025-03-25 PROCEDURE — 97535 SELF CARE MNGMENT TRAINING: CPT | Mod: GO

## 2025-03-25 PROCEDURE — 97116 GAIT TRAINING THERAPY: CPT | Mod: GP

## 2025-03-25 RX ADMIN — Medication 3 MG: at 22:12

## 2025-03-25 RX ADMIN — DOCUSATE SODIUM 100 MG: 100 CAPSULE, LIQUID FILLED ORAL at 08:39

## 2025-03-25 RX ADMIN — ACETAMINOPHEN 650 MG: 325 TABLET, FILM COATED ORAL at 06:51

## 2025-03-25 RX ADMIN — APIXABAN 2.5 MG: 2.5 TABLET, FILM COATED ORAL at 21:27

## 2025-03-25 RX ADMIN — METOPROLOL TARTRATE 50 MG: 50 TABLET, FILM COATED ORAL at 08:39

## 2025-03-25 RX ADMIN — APIXABAN 2.5 MG: 2.5 TABLET, FILM COATED ORAL at 08:40

## 2025-03-25 RX ADMIN — VALSARTAN 320 MG: 80 TABLET, FILM COATED ORAL at 08:39

## 2025-03-25 RX ADMIN — DOCUSATE SODIUM 100 MG: 100 CAPSULE, LIQUID FILLED ORAL at 21:27

## 2025-03-25 RX ADMIN — ACETAMINOPHEN 650 MG: 325 TABLET, FILM COATED ORAL at 14:10

## 2025-03-25 RX ADMIN — METOPROLOL TARTRATE 50 MG: 50 TABLET, FILM COATED ORAL at 21:27

## 2025-03-25 RX ADMIN — Medication 1 TABLET: at 08:39

## 2025-03-25 RX ADMIN — Medication 500 MG: at 08:39

## 2025-03-25 RX ADMIN — ACETAMINOPHEN 650 MG: 325 TABLET, FILM COATED ORAL at 22:12

## 2025-03-25 RX ADMIN — DOXYCYCLINE HYCLATE 100 MG: 100 TABLET, COATED ORAL at 08:39

## 2025-03-25 RX ADMIN — OXYCODONE HYDROCHLORIDE 5 MG: 5 TABLET ORAL at 06:49

## 2025-03-25 ASSESSMENT — PAIN SCALES - GENERAL
PAINLEVEL_OUTOF10: 6
PAINLEVEL_OUTOF10: 9
PAINLEVEL_OUTOF10: 5 - MODERATE PAIN
PAINLEVEL_OUTOF10: 5 - MODERATE PAIN
PAINLEVEL_OUTOF10: 0 - NO PAIN
PAINLEVEL_OUTOF10: 6
PAINLEVEL_OUTOF10: 0 - NO PAIN
PAINLEVEL_OUTOF10: 0 - NO PAIN

## 2025-03-25 ASSESSMENT — PAIN - FUNCTIONAL ASSESSMENT
PAIN_FUNCTIONAL_ASSESSMENT: 0-10

## 2025-03-25 ASSESSMENT — ACTIVITIES OF DAILY LIVING (ADL): HOME_MANAGEMENT_TIME_ENTRY: 35

## 2025-03-25 ASSESSMENT — PAIN DESCRIPTION - DESCRIPTORS: DESCRIPTORS: ACHING

## 2025-03-25 ASSESSMENT — PAIN DESCRIPTION - ORIENTATION: ORIENTATION: RIGHT

## 2025-03-25 ASSESSMENT — PAIN DESCRIPTION - LOCATION: LOCATION: KNEE

## 2025-03-25 NOTE — PROGRESS NOTES
Occupational Therapy    OT Treatment    Patient Name: Jen Daly  MRN: [id number]  Department: [facility] REHAB  Room: [address] [place] [address]  Today's Date: 3/25/2025  Time Calculation  Start Time: 1345  Stop Time: 1430  Time Calculation (min): 45 min        Assessment:  End of Session Communication:  (handoff to PT)  End of Session Patient Position: Up in chair, Alarm on     Plan:  Treatment Interventions: ADL retraining, UE strengthening/ROM, Functional transfer training, Endurance training, Patient/family training, Equipment evaluation/education  OT Frequency: 90 min/5 days per week (x 1 week)  OT Discharge Recommendations:  (anticipate need for sba/cga for transfers, mobility, ADLs; intermittent assist for higher level IADLs)  Equipment Recommended upon Discharge: Wheeled walker (3:1 commode, shower seat, sock aid, reacher)  Treatment Interventions: ADL retraining, UE strengthening/ROM, Functional transfer training, Endurance training, Patient/family training, Equipment evaluation/education    Subjective   Previous Visit Info:  OT Last Visit  OT Received On: 03/25/25    General:  General  Prior to Session Communication: Bedside nurse  Patient Position Received: Alarm on, Up in chair  General Comment: patient is pleasant and cooperative, however anxious    Precautions:  LE Weight Bearing Status: Weight Bearing as Tolerated  Medical Precautions: Fall precautions  Post-Surgical Precautions: Right total knee precautions            Pain:  Pain Assessment  Pain Assessment: 0-10  0-10 (Numeric) Pain Score: 6  Pain Location: Knee  Pain Orientation: Right  Pain Interventions: Cold pack, Repositioned, Medication (See MAR)  Response to Interventions:  (requested Tylenol - notified nursing)    Objective      Functional Standing Tolerance:  Functional Standing Tolerance Comments: static standing with BUE support for 2:10 and 2:12 with cga and encouragement    Bed Mobility/Transfers: Transfers  Transfer:  (min assist sit to stand transfers  from wheelchair; cues for safe hand placement)       Therapy/Activity: Therapeutic Exercise  Therapeutic Exercise Performed:  (completes BUE ther ex using one pound weights, 10 reps x 2 sets x 4 exercises, with supervision and min cues for technique)     EDUCATION:  Education  Individual(s) Educated: Patient  Education Provided:  (safe transfers, UE HEP)  Patient Response to Education: Patient/Caregiver Verbalized Understanding of Information, Patient/Caregiver Performed Return Demonstration of Exercises/Activities (patient will benefit from further education)    Goals:  Encounter Problems       Encounter Problems (Active)       OT Problem       LTG - Patient will complete grooming independently.  (Progressing)       Start:  03/22/25    Expected End:  03/29/25            LTG - Patient will complete toileting with sba. (Progressing)       Start:  03/22/25    Expected End:  03/29/25            LTG - Patient will complete bathing with sba. (Progressing)       Start:  03/22/25    Expected End:  03/29/25            LTG - Patient will complete UE dressing with set up. (Progressing)       Start:  03/22/25    Expected End:  03/29/25            LTG - Patient will complete LE dressing using adaptive equip as needed with sba. (Progressing)       Start:  03/22/25    Expected End:  03/29/25            LTG - Patient will complete commode transfer via device as needed with sba. (Progressing)       Start:  03/22/25    Expected End:  03/29/25            LTG - Patient will complete shower transfer using DME as needed with cga. (Progressing)       Start:  03/22/25    Expected End:  03/29/25            LTG - Patient will complete functional mobility via device as needed with sba. (Progressing)       Start:  03/22/25    Expected End:  03/29/25

## 2025-03-25 NOTE — PROGRESS NOTES
Occupational Therapy    OT Treatment    Patient Name: Jen Daly  MRN: [id number]  Department: [facility] REHAB  Room: [address] [place] [address]  Today's Date: 3/25/2025  Time Calculation  Start Time: 0745  Stop Time: 0830  Time Calculation (min): 45 min        Assessment:  End of Session Communication: Bedside nurse  End of Session Patient Position: Up in chair, Alarm on     Plan:  Treatment Interventions: ADL retraining, UE strengthening/ROM, Functional transfer training, Endurance training, Patient/family training, Equipment evaluation/education  OT Frequency: 90 min/5 days per week (x 1 week)  OT Discharge Recommendations:  (anticipate need for sba/cga for transfers, mobility, ADLs; intermittent assist for higher level IADLs)  Equipment Recommended upon Discharge: Wheeled walker (3:1 commode, shower seat, sock aid, reacher)  Treatment Interventions: ADL retraining, UE strengthening/ROM, Functional transfer training, Endurance training, Patient/family training, Equipment evaluation/education    Subjective   Previous Visit Info:  OT Last Visit  OT Received On: 03/25/25    General:  General  Prior to Session Communication: Bedside nurse  Patient Position Received: Up in chair, Alarm on  General Comment: patient pleasant and agreeable to therapy; anxious and tearful during session; needs much encouragement throughout    Precautions:  LE Weight Bearing Status: Weight Bearing as Tolerated  Medical Precautions: Fall precautions  Post-Surgical Precautions: Right total knee precautions            Pain:  Pain Assessment  Pain Assessment: 0-10  0-10 (Numeric) Pain Score:  (2/10 rest, up to 6/10 with movement (right knee))  Pain Interventions: Repositioned, Distraction    Objective       Activities of Daily Living: Grooming  Grooming Where Assessed: Sitting sinkside  Grooming Comments: supervised to brush teeth, use mouthwash, brush hair, wash face    UE Bathing  UE Bathing Level of Assistance:  (sba)  UE Bathing Where Assessed: Wheelchair  UE  Bathing Comments: uses premoistened wipes    UE Dressing  UE Dressing Level of Assistance:  (sba)  UE Dressing Where Assessed: Wheelchair    LE Dressing  Pants Level of Assistance:  (dons pants over feet with min/mod assist without adaptive equip (requesting to attempt without); would benefit from education on reacher to improve independence; min assist to pull pants over hips while standing at walker)    Sock Level of Assistance:  (doffs socks with dressing stick with sba; dons socks with sock aid and sba, min cues for technique)       Bed Mobility/Transfers: Transfers  Transfer:  (min assist for sit to stand transfers from wheelchair, fearful of standing)    EDUCATION:  Patient educated on safe transfers and adaptive equip for LE dressing; fair understanding/follow through - will benefit from further education        Goals:  Encounter Problems       Encounter Problems (Active)       OT Problem       LTG - Patient will complete grooming independently.  (Progressing)       Start:  03/22/25    Expected End:  03/29/25            LTG - Patient will complete toileting with sba. (Progressing)       Start:  03/22/25    Expected End:  03/29/25            LTG - Patient will complete bathing with sba. (Progressing)       Start:  03/22/25    Expected End:  03/29/25            LTG - Patient will complete UE dressing with set up. (Progressing)       Start:  03/22/25    Expected End:  03/29/25            LTG - Patient will complete LE dressing using adaptive equip as needed with sba. (Progressing)       Start:  03/22/25    Expected End:  03/29/25            LTG - Patient will complete commode transfer via device as needed with sba. (Progressing)       Start:  03/22/25    Expected End:  03/29/25            LTG - Patient will complete shower transfer using DME as needed with cga. (Progressing)       Start:  03/22/25    Expected End:  03/29/25            LTG - Patient will complete functional mobility via device as needed with sba.  (Progressing)       Start:  03/22/25    Expected End:  03/29/25

## 2025-03-25 NOTE — PROGRESS NOTES
Physical Therapy    Physical Therapy Treatment    Patient Name: Jen Daly  MRN: [id number]  Department: [facility] REHAB  Room: [address] [place] [address]  Today's Date: 3/25/2025  Time Calculation  Start Time: 0915  Stop Time: 1000  Time Calculation (min): 45 min         Assessment/Plan   PT Assessment  End of Session Communication: Bedside nurse  End of Session Patient Position: Up in chair, Alarm on     PT Plan  Treatment/Interventions: Bed mobility, Transfer training, Gait training, Balance training, Neuromuscular re-education, Strengthening, Endurance training, Therapeutic exercise, Therapeutic activity, Home exercise program, Stair training  PT Plan: Ongoing PT  PT Frequency: 90 min/5 days per week (for 1 week)  PT Discharge Recommendations: Other (comment) (Anticipate discharge home at walker level with recommendaton for SBA with intermittent assist for high level tasks)  Equipment Recommended upon Discharge: Wheeled walker  PT Recommended Transfer Status: Assist x2      General Visit Information:   PT  Visit  PT Received On: 03/25/25  General  Prior to Session Communication: Bedside nurse  Patient Position Received: Up in chair, Alarm on  General Comment: patient is pleasant and cooperative, however highly anxious    Subjective   Precautions:  Precautions  LE Weight Bearing Status: Weight Bearing as Tolerated  Medical Precautions: Fall precautions  Post-Surgical Precautions: Right total knee precautions    Objective   Pain:  Pain Assessment  Pain Assessment: 0-10  0-10 (Numeric) Pain Score: 9 (R knee ,2/10 at rest, 9/10 with gait.)  Pain Interventions: Repositioned, Emotional support, Distraction  Response to Interventions: Increase in pain     Treatments:  Therapeutic Exercise  Therapeutic Exercise Performed:  (Pt performs seated BLE therex 10 x 2 reps: ankle pumps, LAQs, marching; in order to improve strength related to transfers, gait, and balance.)    Ambulation/Gait Training  Ambulation/Gait Training Performed:  Yes  Ambulation/Gait Training 1  Comments/Distance (ft) 1: Pt amb 14', 18', and 6' with FWW and min A plus wc follow for safety. Gait is slow/effortful, at times pt stepping L foot too far forward next to walker wheels, cues to stay inside middle of walker to avoid LOB. Pt does not display any R knee buckling despite high fear of buckling.  Transfers  Transfer:  (sit/stand transfers with min A.)    Education Documentation  Pt educated on techniques for transfers and gait training with device in order to maximize safety and independence.  Pt educated on therapeutic exercises, including optimal techniques to maximize function.       Encounter Problems       Encounter Problems (Active)       PT Problem       LTG - Pt will perform bed mobility with SBA  (Progressing)       Start:  03/22/25    Expected End:  03/29/25            LTG - Pt will perform transfers with SBA.  (Progressing)       Start:  03/22/25    Expected End:  03/29/25            LTG - Pt will amb 30 feet with FWW and SBA.   (Progressing)       Start:  03/22/25    Expected End:  03/29/25            LTG - Pt will up/down 1+1 stairs with device and min/mod A x 2 (Progressing)       Start:  03/22/25    Expected End:  03/29/25

## 2025-03-25 NOTE — PROGRESS NOTES
Physical Therapy    Physical Therapy Treatment    Patient Name: Jen Daly  MRN: [id number]  Department: [facility] REHAB  Room: [address] [place] [address]  Today's Date: 3/25/2025  Time Calculation  Start Time: 1430  Stop Time: 1515  Time Calculation (min): 45 min         Assessment/Plan   PT Assessment  End of Session Communication: Bedside nurse  End of Session Patient Position: Bed, 2 rail up, Alarm on (call light and needs within reach. Spouse present.)     PT Plan  Treatment/Interventions: Bed mobility, Transfer training, Gait training, Balance training, Neuromuscular re-education, Strengthening, Endurance training, Therapeutic exercise, Therapeutic activity, Home exercise program, Stair training  PT Plan: Ongoing PT  PT Frequency: 90 min/5 days per week (for 1 week)  PT Discharge Recommendations: Other (comment) (Anticipate discharge home at walker level with recommendaton for SBA with intermittent assist for high level tasks)  Equipment Recommended upon Discharge: Wheeled walker  PT Recommended Transfer Status: Assist x2      General Visit Information:      General  Prior to Session Communication:  (OT handoff)  Patient Position Received: Alarm on, Up in chair  General Comment: Patient pleasant and agreeable to therapy. Noted anxious behavior thorughout.    Subjective   Precautions:  Precautions  LE Weight Bearing Status: Weight Bearing as Tolerated  Medical Precautions: Fall precautions  Post-Surgical Precautions: Right total knee precautions    Objective   Pain:  Pain Assessment  Pain Assessment: 0-10  0-10 (Numeric) Pain Score: 5 - Moderate pain  Pain Location: Knee  Pain Orientation: Right  Pain Interventions: Cold pack, Medication (See MAR), Emotional support  Extremity/Trunk Assessments:  AROM RLE (degrees)  R Knee Flexion 0-130: 85 (in sitting)  R Knee Extension 0-130: 10 ((of flexion) in supine)  Treatments:  Therapeutic Exercise  Therapeutic Exercise Performed: Yes  Patient performed supine therex, B LE: AP, QS, and GS  all [id number], R LE only: heel slides (AAROM), SAQ, and SLR (AAROM) all 2 [id number]. Done to increased LE strength and mobility and improve overall functional independence.      Bed Mobility  Bed Mobility: Yes  Patient performed sit<>sup on gym mat and sit>sup on hospital bed with Shmuel/ModA x1. Assist with B LE and positioning.     Transfers  Transfer: Yes  Patient performed sit<>stand transfer with Shmuel x1. Cues for hand placement and sequencing  Patient performed stand pivot transfer WC<>EOM and WC>EOB with Shmuel x1-2 with FWW for support. Cues for sequencing and alignment with surface prior to sitting.       Education Documentation  Educated patient on therapeutic exercises and transfer training techniques to maximize safety and independence.       OP EDUCATION:       Encounter Problems       Encounter Problems (Active)       PT Problem       LTG - Pt will perform bed mobility with SBA  (Progressing)       Start:  03/22/25    Expected End:  03/29/25            LTG - Pt will perform transfers with SBA.  (Progressing)       Start:  03/22/25    Expected End:  03/29/25            LTG - Pt will amb 30 feet with FWW and SBA.   (Progressing)       Start:  03/22/25    Expected End:  03/29/25            LTG - Pt will up/down 1+1 stairs with device and min/mod A x 2 (Progressing)       Start:  03/22/25    Expected End:  03/29/25

## 2025-03-25 NOTE — CARE PLAN
Problem: Skin  Goal: Participates in plan/prevention/treatment measures  3/25/2025 1305 by Marlen Asenjo, LPN  Outcome: Progressing  3/25/2025 1213 by Marlen Asenjo, LPN  Outcome: Progressing  Goal: Prevent/manage excess moisture  3/25/2025 1305 by Marlen Asenjo, LPN  Outcome: Progressing  3/25/2025 1213 by Marlen Asenjo, LPN  Outcome: Progressing  Goal: Prevent/minimize sheer/friction injuries  3/25/2025 1305 by Marlen Asenjo, LPN  Outcome: Progressing  3/25/2025 1213 by Marlen Asenjo, LPN  Outcome: Progressing  Goal: Promote/optimize nutrition  3/25/2025 1305 by Marlen Asenjo, LPN  Outcome: Progressing  3/25/2025 1213 by Marlen Asenjo, LPN  Outcome: Progressing  Goal: Promote skin healing  3/25/2025 1305 by Marlen Asenjo, LPN  Outcome: Progressing  3/25/2025 1213 by Marlen Asenjo, LPN  Outcome: Progressing   The patient's goals for the shift include      The clinical goals for the shift include safety and comfort      Problem: Pain  Goal: Walks with improved pain control throughout the shift  3/25/2025 1305 by Marlen Asenjo, LPN  Outcome: Progressing  3/25/2025 1213 by Marlen Asenjo, LPN  Outcome: Progressing  Goal: Performs ADL's with improved pain control throughout shift  3/25/2025 1305 by Marlen Asenjo, LPN  Outcome: Progressing  3/25/2025 1213 by Marlen Asenjo, LPN  Outcome: Progressing  Goal: Participates in PT with improved pain control throughout the shift  3/25/2025 1305 by Marlen Asenjo, LPN  Outcome: Progressing  3/25/2025 1213 by Marlen Asenjo, LPN  Outcome: Progressing  Goal: Free from opioid side effects throughout the shift  3/25/2025 1305 by Marlen Asenjo, LPN  Outcome: Progressing  3/25/2025 1213 by Marlen Asenjo, LPN  Outcome: Progressing  Goal: Free from acute confusion related to pain meds throughout the shift  3/25/2025 1305 by Marlen Asenjo, LPN  Outcome: Progressing  3/25/2025 1213 by Marlen Asenjo, LPN  Outcome: Progressing

## 2025-03-25 NOTE — PROGRESS NOTES
Occupational Therapy                 Therapy Communication Note    Patient Name: Jen Daly  MRN: [id number]  Department: [facility] REHAB  Room: [id number]-  Today's Date: 3/25/2025     Discipline: Occupational Therapy          Missed Visit Reason:   patient out of building for medical appointment    Missed Time: Cancel 1000

## 2025-03-26 PROCEDURE — 97110 THERAPEUTIC EXERCISES: CPT | Mod: GP

## 2025-03-26 PROCEDURE — 97116 GAIT TRAINING THERAPY: CPT | Mod: GP

## 2025-03-26 PROCEDURE — 97530 THERAPEUTIC ACTIVITIES: CPT | Mod: GP

## 2025-03-26 PROCEDURE — 1180000001 HC REHAB PRIVATE ROOM DAILY

## 2025-03-26 PROCEDURE — 2500000002 HC RX 250 W HCPCS SELF ADMINISTERED DRUGS (ALT 637 FOR MEDICARE OP, ALT 636 FOR OP/ED): Performed by: PHYSICAL MEDICINE & REHABILITATION

## 2025-03-26 PROCEDURE — 97535 SELF CARE MNGMENT TRAINING: CPT | Mod: GO,CO

## 2025-03-26 PROCEDURE — 97110 THERAPEUTIC EXERCISES: CPT | Mod: GO,CO

## 2025-03-26 PROCEDURE — 2500000001 HC RX 250 WO HCPCS SELF ADMINISTERED DRUGS (ALT 637 FOR MEDICARE OP): Performed by: PHYSICAL MEDICINE & REHABILITATION

## 2025-03-26 RX ORDER — HYDRALAZINE HYDROCHLORIDE 50 MG/1
50 TABLET, FILM COATED ORAL 4 TIMES DAILY PRN
Status: DISCONTINUED | OUTPATIENT
Start: 2025-03-26 | End: 2025-03-30 | Stop reason: HOSPADM

## 2025-03-26 RX ADMIN — METOPROLOL TARTRATE 50 MG: 50 TABLET, FILM COATED ORAL at 20:16

## 2025-03-26 RX ADMIN — ACETAMINOPHEN 650 MG: 325 TABLET, FILM COATED ORAL at 08:05

## 2025-03-26 RX ADMIN — VALSARTAN 320 MG: 80 TABLET, FILM COATED ORAL at 08:02

## 2025-03-26 RX ADMIN — ACETAMINOPHEN 650 MG: 325 TABLET, FILM COATED ORAL at 15:22

## 2025-03-26 RX ADMIN — HYDRALAZINE HYDROCHLORIDE 50 MG: 50 TABLET ORAL at 05:19

## 2025-03-26 RX ADMIN — APIXABAN 2.5 MG: 2.5 TABLET, FILM COATED ORAL at 20:16

## 2025-03-26 RX ADMIN — APIXABAN 2.5 MG: 2.5 TABLET, FILM COATED ORAL at 08:02

## 2025-03-26 RX ADMIN — METOPROLOL TARTRATE 50 MG: 50 TABLET, FILM COATED ORAL at 08:02

## 2025-03-26 RX ADMIN — Medication 1 TABLET: at 08:02

## 2025-03-26 RX ADMIN — HYDRALAZINE HYDROCHLORIDE 50 MG: 50 TABLET ORAL at 16:23

## 2025-03-26 ASSESSMENT — PAIN SCALES - GENERAL
PAINLEVEL_OUTOF10: 0 - NO PAIN
PAINLEVEL_OUTOF10: 5 - MODERATE PAIN
PAINLEVEL_OUTOF10: 4
PAINLEVEL_OUTOF10: 5 - MODERATE PAIN
PAINLEVEL_OUTOF10: 4
PAINLEVEL_OUTOF10: 4
PAINLEVEL_OUTOF10: 5 - MODERATE PAIN
PAINLEVEL_OUTOF10: 5 - MODERATE PAIN
PAINLEVEL_OUTOF10: 4

## 2025-03-26 ASSESSMENT — PAIN - FUNCTIONAL ASSESSMENT
PAIN_FUNCTIONAL_ASSESSMENT: 0-10

## 2025-03-26 ASSESSMENT — PAIN DESCRIPTION - LOCATION
LOCATION: KNEE
LOCATION: KNEE

## 2025-03-26 ASSESSMENT — PAIN DESCRIPTION - ORIENTATION
ORIENTATION: RIGHT
ORIENTATION: RIGHT

## 2025-03-26 ASSESSMENT — ACTIVITIES OF DAILY LIVING (ADL)
HOME_MANAGEMENT_TIME_ENTRY: 45
HOME_MANAGEMENT_TIME_ENTRY: 20

## 2025-03-26 NOTE — CARE PLAN
The patient's goals for the shift include      The clinical goals for the shift include safety and comfort    Problem: Skin  Goal: Participates in plan/prevention/treatment measures  Outcome: Progressing  Flowsheets (Taken 3/26/2025 1207)  Participates in plan/prevention/treatment measures: Elevate heels  Goal: Prevent/manage excess moisture  Outcome: Progressing  Flowsheets (Taken 3/26/2025 1207)  Prevent/manage excess moisture: Monitor for/manage infection if present  Goal: Prevent/minimize sheer/friction injuries  Outcome: Progressing  Flowsheets (Taken 3/26/2025 1207)  Prevent/minimize sheer/friction injuries: Use pull sheet  Goal: Promote/optimize nutrition  Outcome: Progressing  Flowsheets (Taken 3/26/2025 1207)  Promote/optimize nutrition: Offer water/supplements/favorite foods  Goal: Promote skin healing  Outcome: Progressing  Flowsheets (Taken 3/26/2025 1207)  Promote skin healing: Assess skin/pad under line(s)/device(s)     Problem: Pain  Goal: Walks with improved pain control throughout the shift  Outcome: Progressing  Goal: Performs ADL's with improved pain control throughout shift  Outcome: Progressing  Goal: Participates in PT with improved pain control throughout the shift  Outcome: Progressing  Goal: Free from opioid side effects throughout the shift  Outcome: Progressing  Goal: Free from acute confusion related to pain meds throughout the shift  Outcome: Progressing

## 2025-03-26 NOTE — PROGRESS NOTES
Physical Therapy    Physical Therapy Treatment    Patient Name: Jen Daly  MRN: [id number]  Department: [facility] REHAB  Room: [address] [place] [address]  Today's Date: 3/26/2025  Time Calculation  Start Time: 1300  Stop Time: 1345  Time Calculation (min): 45 min         Assessment/Plan   PT Assessment  Assessment Comment: Improving gait confidence, progress distance as tolerated.  End of Session Patient Position: Up in bathroom     PT Plan  Treatment/Interventions: Bed mobility, Transfer training, Gait training, Balance training, Neuromuscular re-education, Strengthening, Endurance training, Therapeutic exercise, Therapeutic activity, Home exercise program, Stair training  PT Plan: Ongoing PT  PT Frequency: 90 min/5 days per week (for 1 week)  PT Discharge Recommendations: Other (comment) (Anticipate discharge home at walker level with recommendaton for SBA with intermittent assist for high level tasks)  Equipment Recommended upon Discharge: Wheeled walker  PT Recommended Transfer Status: Assist x2      General Visit Information:   PT  Visit  PT Received On: 03/26/25  Response to Previous Treatment: Patient reporting fatigue but able to participate.  General  Patient Position Received: Alarm on, Up in chair  General Comment: Patient feeling better this afternoon since moving bowels.    Subjective   Precautions:  Precautions  Post-Surgical Precautions: Right total knee precautions            Objective   Pain:  Pain Assessment  Pain Assessment: 0-10  0-10 (Numeric) Pain Score: 4  Pain Type: Surgical pain  Pain Location: Knee  Pain Orientation: Right  Pain Interventions: Ambulation/increased activity    Activity Tolerance:     Treatments:  Therapeutic Exercise  Therapeutic Exercise Performed: Yes (Performed supine LE exercise per TKR protocol 20 reps ea; AP, QS, GS, AAROM using sheet for RLE HS, SAQ, hip abduct.  Tactile cues to keep Rt foot in neutral position throughout.)    Bed Mobility  Bed Mobility: Yes (Min assist for sit to supine  to lift RLE onto plinth, CGA to swing RLE off of plinth.)    Ambulation/Gait Training  Ambulation/Gait Training Performed: Yes (Instructed in gait using ww with min assist and cues for stride sequence. Pt amb 25 ft x 1, 15 ft x 1 with min assist to CGA with w/c follow.)  Transfers  Transfer: Yes (Instructed in sit to stand transfers from w/c with ww support and transfer to plinth with CGA. Cues for positioning, hand placement and push off.)        Education Documentation  Patient educated in LE exercise per Total Knee protocol for increased RLE ROM and strengthening.  Instructed in transfers and gait with cues for positioning, hand placement and push off and use of ww for gait training.             Encounter Problems       Encounter Problems (Active)       PT Problem       LTG - Pt will perform bed mobility with SBA  (Progressing)       Start:  03/22/25    Expected End:  03/29/25            LTG - Pt will perform transfers with SBA.  (Progressing)       Start:  03/22/25    Expected End:  03/29/25            LTG - Pt will amb 30 feet with FWW and SBA.   (Progressing)       Start:  03/22/25    Expected End:  03/29/25            LTG - Pt will up/down 1+1 stairs with device and min/mod A x 2 (Progressing)       Start:  03/22/25    Expected End:  03/29/25

## 2025-03-26 NOTE — PROGRESS NOTES
Physical Therapy    Physical Therapy Treatment    Patient Name: Jen Daly  MRN: [id number]  Department: [facility] REHAB  Room: [address] [place] [address]  Today's Date: 3/26/2025  Time Calculation  Start Time: 1000  Stop Time: 1045  Time Calculation (min): 45 min         Assessment/Plan   PT Assessment  End of Session Patient Position: Up in bathroom     PT Plan  Treatment/Interventions: Bed mobility, Transfer training, Gait training, Balance training, Neuromuscular re-education, Strengthening, Endurance training, Therapeutic exercise, Therapeutic activity, Home exercise program, Stair training  PT Plan: Ongoing PT  PT Frequency: 90 min/5 days per week (for 1 week)  PT Discharge Recommendations: Other (comment) (Anticipate discharge home at walker level with recommendaton for SBA with intermittent assist for high level tasks)  Equipment Recommended upon Discharge: Wheeled walker  PT Recommended Transfer Status: Assist x2      General Visit Information:   PT  Visit  PT Received On: 03/26/25  Response to Previous Treatment: Patient reporting fatigue but able to participate.  General  Patient Position Received: Alarm on, Up in chair  General Comment: Patient reports having received stool softener with possible need to use the bathroom in near future.    Subjective   Precautions:               Objective   Pain:  Pain Assessment  Pain Assessment: 0-10  0-10 (Numeric) Pain Score: 4  Pain Type: Surgical pain  Pain Location: Knee  Pain Orientation: Right  Pain Interventions: Ambulation/increased activity    Activity Tolerance:     Treatments:  Therapeutic Exercise  Therapeutic Exercise Performed: Yes (Instructed in seated LE exercise per knee protocol: 20 reps x 2 AP, LAQ, heel slide on towel for ROM RLE. 2# on LLE.)         Ambulation/Gait Training  Ambulation/Gait Training Performed: Yes (Instructed in gait using parallel bars with min assist x 1. Walk forward, backwards and side step length of bars x reps each. Slow step to gait, cues to  extend Rt knee in stance for stability.)  Transfers  Transfer: Yes (Instructed in transfers from w/c to  parallel bars and standing pivot transfer w/c to BSC for toileting with moderate assist.)          Education Documentation  Patient instructed in LE exercise per Total Knee protocol to increase strength and ROM.  Patient instructed in pre-gait activity to build reassurance and progress wt bearing tolerance.           Encounter Problems       Encounter Problems (Active)       PT Problem       LTG - Pt will perform bed mobility with SBA  (Progressing)       Start:  03/22/25    Expected End:  03/29/25            LTG - Pt will perform transfers with SBA.  (Progressing)       Start:  03/22/25    Expected End:  03/29/25            LTG - Pt will amb 30 feet with FWW and SBA.   (Progressing)       Start:  03/22/25    Expected End:  03/29/25            LTG - Pt will up/down 1+1 stairs with device and min/mod A x 2 (Progressing)       Start:  03/22/25    Expected End:  03/29/25

## 2025-03-26 NOTE — PROGRESS NOTES
Occupational Therapy    OT Treatment    Patient Name: Jen Daly  MRN: [id number]  Department: [facility]  Room: [address] [place] [address]  Today's Date: 3/26/2025  Time Calculation  Start Time: 1045  Stop Time: 1130  Time Calculation (min): 45 min        Assessment:  End of Session Communication: Bedside nurse  End of Session Patient Position: Up in chair, Alarm on     Plan:  Treatment Interventions: ADL retraining, UE strengthening/ROM, Functional transfer training, Endurance training, Patient/family training, Equipment evaluation/education  OT Frequency: 90 min/5 days per week (x 1 week)  OT Discharge Recommendations:  (anticipate need for sba/cga for transfers, mobility, ADLs; intermittent assist for higher level IADLs)  Equipment Recommended upon Discharge: Wheeled walker (3:1 commode, shower seat, sock aid, reacher)  Treatment Interventions: ADL retraining, UE strengthening/ROM, Functional transfer training, Endurance training, Patient/family training, Equipment evaluation/education    Subjective   Previous Visit Info:  OT Last Visit  OT Received On: 03/26/25  General:  General  Prior to Session Communication: Bedside nurse  Patient Position Received: Alarm on, Up in chair  General Comment: Patient agreeable to OT session. Just having bowel movement with nurse in bathroom.  Precautions:  LE Weight Bearing Status: Weight Bearing as Tolerated  Medical Precautions: Fall precautions  Post-Surgical Precautions: Right total knee precautions    Pain:  Pain Assessment  Pain Assessment: 0-10  0-10 (Numeric) Pain Score: 5 - Moderate pain  Pain Type: Surgical pain  Pain Location: Knee  Pain Orientation: Right  Pain Interventions: Cold applied, Repositioned, Ambulation/increased activity, Distraction, Emotional support    Objective      Activities of Daily Living: UE Dressing  UE Dressing Level of Assistance: Setup  UE Dressing Where Assessed: Wheelchair    LE Dressing  Pants Level of Assistance:  (Doffs pants from hips at ww with CGA,  removes from BLE using dressing stick with SBA. Dons pants over feet using reacher with SBA, pulls pants up over hips with Min A at ww.)  Sock Level of Assistance:  (Doffs socks using dressing stick with SBA)    Bed Mobility/Transfers: Transfers  Transfer: Yes  Transfer 1  Trials/Comments 1: Sit to stand transfers with Min A. Patient reports R knee unstable, however appears stable.  Transfers 2  Trials/Comments 2: Stand pivot transfer via ww with Min A   EDUCATION:  Education  Individual(s) Educated: Patient  Education Provided: POC discussed and agreed upon, Fall precautons, Risk and benefits of OT discussed with patient or other, Diagnosis & Precautions  Education Comment: Education provided on lower body dressing a/e for imroved indep with ADLs. Education provided on walker safety. Education provided on non-pharmacological pain relief techinques    Goals:  Encounter Problems       Encounter Problems (Active)       OT Problem       LTG - Patient will complete grooming independently.  (Progressing)       Start:  03/22/25    Expected End:  03/29/25            LTG - Patient will complete toileting with sba. (Progressing)       Start:  03/22/25    Expected End:  03/29/25            LTG - Patient will complete bathing with sba. (Progressing)       Start:  03/22/25    Expected End:  03/29/25            LTG - Patient will complete UE dressing with set up. (Progressing)       Start:  03/22/25    Expected End:  03/29/25            LTG - Patient will complete LE dressing using adaptive equip as needed with sba. (Progressing)       Start:  03/22/25    Expected End:  03/29/25            LTG - Patient will complete commode transfer via device as needed with sba. (Progressing)       Start:  03/22/25    Expected End:  03/29/25            LTG - Patient will complete shower transfer using DME as needed with cga. (Progressing)       Start:  03/22/25    Expected End:  03/29/25            LTG - Patient will complete functional  mobility via device as needed with sba. (Progressing)       Start:  03/22/25    Expected End:  03/29/25

## 2025-03-26 NOTE — PROGRESS NOTES
Occupational Therapy    OT Treatment    Patient Name: Jen Daly  MRN: [id number]  Department: [facility]  Room: [address] [place] [address]  Today's Date: 3/26/2025  Time Calculation  Start Time: 1345  Stop Time: 1430  Time Calculation (min): 45 min        Assessment:  End of Session Communication: Bedside nurse  End of Session Patient Position: Up in chair, Alarm on     Plan:  Treatment Interventions: ADL retraining, UE strengthening/ROM, Functional transfer training, Endurance training, Patient/family training, Equipment evaluation/education  OT Frequency: 90 min/5 days per week (x 1 week)  OT Discharge Recommendations:  (anticipate need for sba/cga for transfers, mobility, ADLs; intermittent assist for higher level IADLs)  Equipment Recommended upon Discharge: Wheeled walker (3:1 commode, shower seat, sock aid, reacher)  Treatment Interventions: ADL retraining, UE strengthening/ROM, Functional transfer training, Endurance training, Patient/family training, Equipment evaluation/education    Subjective   Previous Visit Info:  OT Last Visit  OT Received On: 03/26/25  General:  General  Prior to Session Communication: Bedside nurse  Patient Position Received: Alarm on, Up in chair  General Comment: Patient agreeable to OT. Reports feeling tired from therapies. Ice pack applied to R knee at end of session.  Precautions:  LE Weight Bearing Status: Weight Bearing as Tolerated  Medical Precautions: Fall precautions  Post-Surgical Precautions: Right total knee precautions    Pain:  Pain Assessment  Pain Assessment: 0-10  0-10 (Numeric) Pain Score: 5 - Moderate pain  Pain Type: Surgical pain  Pain Location: Knee  Pain Orientation: Right  Pain Interventions: Cold applied, Distraction, Rest, Elevated    Objective      Activities of Daily Living: LE Dressing  Pants Level of Assistance:  (Doffs pants from hips at ww with CGA, removes from BLE using dressing stick with SBA. Dons pants over feet using reacher with SBA, pulls pants up over hips  with Min A at ww.)  Sock Level of Assistance:  (Doffs socks using dressing stick with SBA and cues for best technique. Therapist dons amanda hose. Dons socks with sock aid and SBA-Min A and cues for best technique)    Bed Mobility/Transfers: Transfers  Transfer: Yes  Transfer 1  Trials/Comments 1: Sit to stand transfers with CGA  Transfers 2  Trials/Comments 2: Stand pivot transfer via ww with Min A    Therapy/Activity: Therapeutic Exercise  Therapeutic Exercise Performed: Yes  Therapeutic Exercise Activity 1: bue ther ex using 1# weights completing 3 exercises x 10 reps x 3 sets to promote greater indep with ADLs and transfers. Rest breaks given secondary to fatigue.   EDUCATION:  Education  Individual(s) Educated: Patient  Education Provided: POC discussed and agreed upon, Fall precautons, Risk and benefits of OT discussed with patient or other, Diagnosis & Precautions  Education Comment: Continued education provided on lower body dressing a/e. Education provided on rest/activity routine to reduce stiffness and pain.    Goals:  Encounter Problems       Encounter Problems (Active)       OT Problem       LTG - Patient will complete grooming independently.  (Progressing)       Start:  03/22/25    Expected End:  03/29/25            LTG - Patient will complete toileting with sba. (Progressing)       Start:  03/22/25    Expected End:  03/29/25            LTG - Patient will complete bathing with sba. (Progressing)       Start:  03/22/25    Expected End:  03/29/25            LTG - Patient will complete UE dressing with set up. (Progressing)       Start:  03/22/25    Expected End:  03/29/25            LTG - Patient will complete LE dressing using adaptive equip as needed with sba. (Progressing)       Start:  03/22/25    Expected End:  03/29/25            LTG - Patient will complete commode transfer via device as needed with sba. (Progressing)       Start:  03/22/25    Expected End:  03/29/25            LTG - Patient will  complete shower transfer using DME as needed with cga. (Progressing)       Start:  03/22/25    Expected End:  03/29/25            LTG - Patient will complete functional mobility via device as needed with sba. (Progressing)       Start:  03/22/25    Expected End:  03/29/25

## 2025-03-26 NOTE — CARE PLAN
The patient's goals for the shift include free from fall.     The clinical goals for the shift include safety and comfort      Problem: Skin  Goal: Participates in plan/prevention/treatment measures  Outcome: Progressing  Goal: Prevent/manage excess moisture  Outcome: Progressing  Goal: Prevent/minimize sheer/friction injuries  Outcome: Progressing  Goal: Promote/optimize nutrition  Outcome: Progressing  Goal: Promote skin healing  Outcome: Progressing     Problem: Pain  Goal: Walks with improved pain control throughout the shift  Outcome: Progressing  Goal: Performs ADL's with improved pain control throughout shift  Outcome: Progressing  Goal: Participates in PT with improved pain control throughout the shift  Outcome: Progressing  Goal: Free from opioid side effects throughout the shift  Outcome: Progressing  Goal: Free from acute confusion related to pain meds throughout the shift  Outcome: Progressing

## 2025-03-27 PROCEDURE — 97110 THERAPEUTIC EXERCISES: CPT | Mod: GO,CO

## 2025-03-27 PROCEDURE — 1180000001 HC REHAB PRIVATE ROOM DAILY

## 2025-03-27 PROCEDURE — 2500000002 HC RX 250 W HCPCS SELF ADMINISTERED DRUGS (ALT 637 FOR MEDICARE OP, ALT 636 FOR OP/ED): Performed by: PHYSICAL MEDICINE & REHABILITATION

## 2025-03-27 PROCEDURE — 2500000001 HC RX 250 WO HCPCS SELF ADMINISTERED DRUGS (ALT 637 FOR MEDICARE OP): Performed by: PHYSICAL MEDICINE & REHABILITATION

## 2025-03-27 PROCEDURE — 99233 SBSQ HOSP IP/OBS HIGH 50: CPT | Performed by: PHYSICAL MEDICINE & REHABILITATION

## 2025-03-27 PROCEDURE — 97110 THERAPEUTIC EXERCISES: CPT | Mod: GP

## 2025-03-27 PROCEDURE — 97116 GAIT TRAINING THERAPY: CPT | Mod: GP

## 2025-03-27 PROCEDURE — 97535 SELF CARE MNGMENT TRAINING: CPT | Mod: GO

## 2025-03-27 RX ADMIN — ACETAMINOPHEN 650 MG: 325 TABLET, FILM COATED ORAL at 10:19

## 2025-03-27 RX ADMIN — HYDRALAZINE HYDROCHLORIDE 50 MG: 50 TABLET ORAL at 04:47

## 2025-03-27 RX ADMIN — METOPROLOL TARTRATE 50 MG: 50 TABLET, FILM COATED ORAL at 08:57

## 2025-03-27 RX ADMIN — APIXABAN 2.5 MG: 2.5 TABLET, FILM COATED ORAL at 20:45

## 2025-03-27 RX ADMIN — APIXABAN 2.5 MG: 2.5 TABLET, FILM COATED ORAL at 08:57

## 2025-03-27 RX ADMIN — Medication 1 TABLET: at 08:57

## 2025-03-27 RX ADMIN — VALSARTAN 320 MG: 80 TABLET, FILM COATED ORAL at 08:56

## 2025-03-27 RX ADMIN — ACETAMINOPHEN 650 MG: 325 TABLET, FILM COATED ORAL at 20:45

## 2025-03-27 RX ADMIN — ACETAMINOPHEN 650 MG: 325 TABLET, FILM COATED ORAL at 02:14

## 2025-03-27 RX ADMIN — METOPROLOL TARTRATE 50 MG: 50 TABLET, FILM COATED ORAL at 20:45

## 2025-03-27 ASSESSMENT — PAIN SCALES - GENERAL
PAINLEVEL_OUTOF10: 2
PAINLEVEL_OUTOF10: 6
PAINLEVEL_OUTOF10: 5 - MODERATE PAIN
PAINLEVEL_OUTOF10: 0 - NO PAIN
PAINLEVEL_OUTOF10: 6
PAINLEVEL_OUTOF10: 4
PAINLEVEL_OUTOF10: 6

## 2025-03-27 ASSESSMENT — COGNITIVE AND FUNCTIONAL STATUS - GENERAL
HELP NEEDED FOR BATHING: A LOT
DAILY ACTIVITIY SCORE: 16
MOVING FROM LYING ON BACK TO SITTING ON SIDE OF FLAT BED WITH BEDRAILS: A LITTLE
STANDING UP FROM CHAIR USING ARMS: A LOT
PERSONAL GROOMING: A LOT
MOVING TO AND FROM BED TO CHAIR: A LOT
DRESSING REGULAR UPPER BODY CLOTHING: A LITTLE
CLIMB 3 TO 5 STEPS WITH RAILING: TOTAL
TURNING FROM BACK TO SIDE WHILE IN FLAT BAD: A LITTLE
TOILETING: A LOT
DRESSING REGULAR LOWER BODY CLOTHING: A LITTLE
WALKING IN HOSPITAL ROOM: TOTAL
MOBILITY SCORE: 12

## 2025-03-27 ASSESSMENT — PAIN - FUNCTIONAL ASSESSMENT
PAIN_FUNCTIONAL_ASSESSMENT: 0-10

## 2025-03-27 ASSESSMENT — PAIN SCALES - WONG BAKER: WONGBAKER_NUMERICALRESPONSE: HURTS EVEN MORE

## 2025-03-27 ASSESSMENT — ACTIVITIES OF DAILY LIVING (ADL): HOME_MANAGEMENT_TIME_ENTRY: 45

## 2025-03-27 ASSESSMENT — PAIN DESCRIPTION - LOCATION: LOCATION: KNEE

## 2025-03-27 NOTE — PROGRESS NOTES
"  Jen Daly is a 75 y.o. female on day 6 of admission presenting with History of total right knee replacement (TKR).    Subjective   Patient was seen today in her room.  She feels she is doing well.  She did not cut her toenails prior to this surgery due to risk of an open area.  She has to continue to work on stairs for safe discharge.       Objective       Physical Exam  Pleasant female no apparent distress.  Alert and oriented x 3  S1-S2  Abdomen soft nontender nondistended with active bowel sounds  Negative Homans bilaterally  Moving all 4 extremities appropriately.  Aquacel dressing over the right knee with mild areas of drainage appreciated  Left knee with mild scabbed area present.  Function: Contact-guard to min assist    Assessment/Plan        Last Recorded Vitals  Blood pressure 162/72, pulse 84, temperature 35.7 °C (96.3 °F), temperature source Temporal, resp. rate 18, height 1.549 m (5' 0.98\"), weight 87 kg (191 lb 12.8 oz), SpO2 94%.    Therapy notes from last 24 hours reviewed.    Relevant Results                  Scheduled medications  apixaban, 2.5 mg, oral, BID  ascorbic acid, 500 mg, oral, BID  calcium carbonate-vitamin D3, 1 tablet, oral, Daily  docusate sodium, 100 mg, oral, BID  metoprolol tartrate, 50 mg, oral, BID  valsartan, 320 mg, oral, Daily      Continuous medications     PRN medications  PRN medications: acetaminophen, clonazePAM, hydrALAZINE, melatonin, methocarbamol, oxyCODONE, oxyCODONE     No results found for this or any previous visit (from the past 24 hours).              Assessment/Plan   Assessment & Plan  History of total right knee replacement (TKR)  -3 hours of PT and OT daily  -Goals go home modified independent      Redness and swelling of knee  -Follow right knee status post surgical intervention  Anxiety  -Manage anxiety developer for fill goals and rehab  -As needed use of clonazepam    Benign secondary hypertension  -Monitor blood pressure.  Currently on metoprolol " and Diovan.  -As needed use of hydralazine    Obesity, morbid (Multi)  -Monitor daily intake            Bowel/Bladder  -facilitate daily active BM   -continence of bladder per timed void schedule and rehab nursing  -check PVRs and treat appropriately    DVT prophylaxis  -SCDs and ambulation  -Chemoprophylaxis for DVT until ambulating >200 feet    FEN  -follow BMP and treat appropriately  -monitor oral intake daily  3/27  -Work on stairs and ambulation for safe discharge  -  Monitor ongoing utilization of oxycodone for pain.  -As needed use of Robaxin but is not using  -Monitor blood pressure and adjust meds as appropriate  -Continue Eliquis for DVT prophylaxis.  -Discharge planning    I was present and led the team conference. The plan of care was developed and agreed upon as discussed and documented during the team meeting.  See separate Note.    Team conference today with the rehab team - PT/OT/ST, SW, RN, Dietary, Case Management. Additional separate note in the chart for today. Over 35 min spent with paint care, team meetings, and coordination of care.         I spent 35 minutes in the professional and overall care of this patient.      Alanis Soni MD

## 2025-03-27 NOTE — PROGRESS NOTES
Physical Therapy    Physical Therapy Treatment    Patient Name: Jen Daly  MRN: [id number]  Department: [facility]  Room: [address] [place] [address]  Today's Date: 3/27/2025  Time Calculation  Start Time: 1300  Stop Time: 1345  Time Calculation (min): 45 min         Assessment/Plan   PT Assessment  End of Session Patient Position: Up in chair, Alarm on     PT Plan  Treatment/Interventions: Bed mobility, Transfer training, Gait training, Balance training, Neuromuscular re-education, Strengthening, Endurance training, Therapeutic exercise, Therapeutic activity, Home exercise program, Stair training  PT Plan: Ongoing PT  PT Frequency: 90 min/5 days per week (for 1 week)  PT Discharge Recommendations: Other (comment) (Anticipate discharge home at walker level with recommendaton for SBA with intermittent assist for high level tasks)  Equipment Recommended upon Discharge: Wheeled walker  PT Recommended Transfer Status: Assist x2      General Visit Information:   PT  Visit  PT Received On: 03/27/25  Response to Previous Treatment: Patient reporting fatigue but able to participate.  General  Patient Position Received: Alarm on, Up in chair  General Comment: Patient regularly reassured regarding her mmobility doubts.    Subjective   Precautions:  Precautions  LE Weight Bearing Status: Weight Bearing as Tolerated  Medical Precautions: Fall precautions  Precautions Comment: Right TKR            Objective   Pain:  Pain Assessment  Pain Assessment: 0-10  0-10 (Numeric) Pain Score: 0 - No pain  Pain Type: Surgical pain  Pain Location: Knee  Pain Orientation: Right  Pain Interventions: Ambulation/increased activity  Activity Tolerance:  Activity Tolerance  Activity Tolerance Comments: Improved gait distance and confidence; progress as tolerated.  Treatments:  Therapeutic Exercise  Therapeutic Exercise Performed: Yes (Instructed in seated LE exercise per knee protocol for ROM and strength: AP, LAQ, HS 20 x 2.)    Ambulation/Gait  Training  Ambulation/Gait Training Performed: Yes (Instructed in gait using parallel bars and ww with min assist. Bars; walk forward/back, sidestep L/R x 2 ea.  Cues for stride sequence and managing turns. Amb 25 ft w/ ww with CGA; slow step to gait. Cues for quad recruitment with stance phase.)  Transfers  Transfer: Yes (CGA for sit to stand transfers from w/c.)    Stairs  Stairs: Yes (Climbs 5 steps x 2 using bilat rails with mod assist x 1. Cues for stride sequence. Climbs 2 graduated platform steps using ww with mod assist with cues for sequencing. Tends to place forearms on rails to push up.)      Education Documentation  Patient provided support, reassurance and direction to further encourage functional performance.  Educated on functional mobility including transfers, gait, and stair training to maximize safe return home.  Instructed in LE exercise pre Total Knee protocol to improve ROM and strength.             Encounter Problems       Encounter Problems (Active)       PT Problem       LTG - Pt will perform bed mobility with SBA  (Progressing)       Start:  03/22/25    Expected End:  03/29/25            LTG - Pt will perform transfers with SBA.  (Progressing)       Start:  03/22/25    Expected End:  03/29/25            LTG - Pt will amb 30 feet with FWW and SBA.   (Progressing)       Start:  03/22/25    Expected End:  03/29/25            LTG - Pt will up/down 1+1 stairs with device and min/mod A x 2 (Progressing)       Start:  03/22/25    Expected End:  03/29/25               Pain - Adult

## 2025-03-27 NOTE — CARE PLAN
Problem: Pain - Adult  Goal: Verbalizes/displays adequate comfort level or baseline comfort level  Outcome: Progressing     Problem: Safety - Adult  Goal: Free from fall injury  Outcome: Progressing     Problem: Chronic Conditions and Co-morbidities  Goal: Patient's chronic conditions and co-morbidity symptoms are monitored and maintained or improved  Outcome: Progressing     Problem: Nutrition  Goal: Nutrient intake appropriate for maintaining nutritional needs  Outcome: Progressing     Problem: Skin  Goal: Participates in plan/prevention/treatment measures  Outcome: Progressing  Goal: Promote/optimize nutrition  Outcome: Progressing  Goal: Promote skin healing  Outcome: Progressing     Problem: Discharge Planning  Goal: Discharge to home or other facility with appropriate resources  Outcome: Progressing

## 2025-03-27 NOTE — PROGRESS NOTES
Occupational Therapy    OT Treatment    Patient Name: Jen Daly  MRN: [id number]  Department: [facility]  Room: [address] [place] [address]  Today's Date: 3/27/2025  Time Calculation  Start Time: 0830  Stop Time: 0915  Time Calculation (min): 45 min        Assessment:  End of Session Communication:  (handed off to PT)  End of Session Patient Position: Up in chair, Alarm on     Plan:  Treatment Interventions: ADL retraining, UE strengthening/ROM, Functional transfer training, Endurance training, Patient/family training, Equipment evaluation/education  OT Frequency: 90 min/5 days per week (x 1 week)  OT Discharge Recommendations:  (anticipate need for sba/cga for transfers, mobility, ADLs; intermittent assist for higher level IADLs)  Equipment Recommended upon Discharge: Wheeled walker (3:1 commode, shower seat, sock aid, reacher)  Treatment Interventions: ADL retraining, UE strengthening/ROM, Functional transfer training, Endurance training, Patient/family training, Equipment evaluation/education    Subjective   Previous Visit Info:  OT Last Visit  OT Received On: 03/27/25  General:  General  Prior to Session Communication: Bedside nurse  Patient Position Received: Up in chair, Alarm on  General Comment: patient pleasant and cooperative to participate  Precautions:  LE Weight Bearing Status: Weight Bearing as Tolerated  Medical Precautions: Fall precautions  Post-Surgical Precautions: Right total knee precautions            Pain:  Pain Assessment  Pain Assessment:  (reports 6/10 pain in R knee; patient medicated by RN during session)    Objective         Activities of Daily Living: Grooming  Grooming Level of Assistance: Setup  Grooming Where Assessed: Wheelchair, Sitting sinkside  Grooming Comments: to brush teeth and wash face    UE Bathing  UE Bathing Level of Assistance: Setup  UE Bathing Where Assessed: Wheelchair, Sitting sinkside    LE Bathing  LE Bathing Comments: patient declining to bath letty area as reports nursing  assisted her this AM when changing brief; therapist offered patient LHS to complete distal LEs, however, patient declined reporting she would like to use LHS in shower only. overall, MAX A to complete    UE Dressing  UE Dressing Level of Assistance: Setup  UE Dressing Where Assessed: Wheelchair    LE Dressing  LE Dressing Adaptive Equipment: Reacher, Sock aide  Pants Level of Assistance: Contact guard  Sock Level of Assistance: Close supervision  LE Dressing Where Assessed: Wheelchair (standing at bed rail to hilario pants over waist/hips)    Toileting  Toileting Comments: declines need to toilet this session       Bed Mobility/Transfers: Transfers  Transfer:  (SPT from recliner chair to w/c with CGA with WW towards Left side and STS at bedrail with CGA)      Education Documentation  Body Mechanics, taught by Yin Melendez OT at 3/27/2025 12:23 PM.  Learner: Patient  Readiness: Acceptance  Method: Explanation  Response: Verbalizes Understanding, Needs Reinforcement    Precautions, taught by Yin Melendez OT at 3/27/2025 12:23 PM.  Learner: Patient  Readiness: Acceptance  Method: Explanation  Response: Verbalizes Understanding, Needs Reinforcement    ADL Training, taught by Yin Melendez OT at 3/27/2025 12:23 PM.  Learner: Patient  Readiness: Acceptance  Method: Explanation  Response: Verbalizes Understanding, Needs Reinforcement    Education Comments  No comments found.        OP EDUCATION:       Goals:  Encounter Problems       Encounter Problems (Active)       OT Problem       LTG - Patient will complete grooming independently.  (Progressing)       Start:  03/22/25    Expected End:  04/03/25            LTG - Patient will complete toileting with sba. (Progressing)       Start:  03/22/25    Expected End:  04/03/25            LTG - Patient will complete bathing with sba. (Progressing)       Start:  03/22/25    Expected End:  04/03/25            LTG - Patient will complete UE dressing with set up. (Progressing)        Start:  03/22/25    Expected End:  04/03/25            LTG - Patient will complete LE dressing using adaptive equip as needed with sba. (Progressing)       Start:  03/22/25    Expected End:  04/03/25            LTG - Patient will complete commode transfer via device as needed with sba. (Progressing)       Start:  03/22/25    Expected End:  04/03/25            LTG - Patient will complete shower transfer using DME as needed with cga. (Progressing)       Start:  03/22/25    Expected End:  04/03/25            LTG - Patient will complete functional mobility via device as needed with sba. (Progressing)       Start:  03/22/25    Expected End:  04/03/25

## 2025-03-27 NOTE — PROGRESS NOTES
Physical Therapy    Physical Therapy Treatment    Patient Name: Jen Daly  MRN: [id number]  Department: [facility] REHAB  Room: [address] [place] [address]  Today's Date: 3/27/2025  Time Calculation  Start Time: 0915  Stop Time: 1000  Time Calculation (min): 45 min         Assessment/Plan   PT Assessment  Assessment Comment: Improving gait confidence, progress distance as tolerated.  End of Session Patient Position: Up in chair, Alarm on     PT Plan  Treatment/Interventions: Bed mobility, Transfer training, Gait training, Balance training, Neuromuscular re-education, Strengthening, Endurance training, Therapeutic exercise, Therapeutic activity, Home exercise program, Stair training  PT Plan: Ongoing PT  PT Frequency: 90 min/5 days per week (for 1 week)  PT Discharge Recommendations: Other (comment) (Anticipate discharge home at walker level with recommendaton for SBA with intermittent assist for high level tasks)  Equipment Recommended upon Discharge: Wheeled walker  PT Recommended Transfer Status: Assist x2      General Visit Information:   PT  Visit  PT Received On: 03/27/25  Response to Previous Treatment: Patient reporting fatigue but able to participate.  General  Patient Position Received: Alarm on, Up in chair  General Comment: Patient feeling better this afternoon since moving bowels.    Subjective   Precautions:  Precautions  LE Weight Bearing Status: Weight Bearing as Tolerated  Medical Precautions: Fall precautions  Post-Surgical Precautions: Right total knee precautions  Precautions Comment: Right TKR            Objective   Pain:  Pain Assessment  Pain Assessment: 0-10  0-10 (Numeric) Pain Score: 6  Pain Type: Surgical pain  Pain Location: Knee  Pain Orientation: Right  Pain Interventions: Ambulation/increased activity    Activity Tolerance:     Treatments:  Therapeutic Exercise  Therapeutic Exercise Performed: Yes (Instructed in Seated LE exercise per knee protocol; AP. LAQ, calf raises, towel slide for flexion, with legs  extended AP. QS, GS 20 x 2.  Standing at dioni bar; calf raises, mini squats w/ CGA.)    Ambulation/Gait Training  Ambulation/Gait Training Performed: Yes (Instructed in gait using ww with min assist and w/c follow for confidence. Amb 22 ft, 23 ft with slow step to gait; much encouragement to go farther.)    Outcome Measures:      Education Documentation  Instructed in functional mobility including exercise per total knee protocol and gait. Provided support and encouragement to build confidence in movement ability to overcome fearfulness of ambulating.             Encounter Problems       Encounter Problems (Active)       PT Problem       LTG - Pt will perform bed mobility with SBA  (Progressing)       Start:  03/22/25    Expected End:  03/29/25            LTG - Pt will perform transfers with SBA.  (Progressing)       Start:  03/22/25    Expected End:  03/29/25            LTG - Pt will amb 30 feet with FWW and SBA.   (Progressing)       Start:  03/22/25    Expected End:  03/29/25            LTG - Pt will up/down 1+1 stairs with device and min/mod A x 2 (Progressing)       Start:  03/22/25    Expected End:  03/29/25               Pain - Adult

## 2025-03-27 NOTE — PROGRESS NOTES
Occupational Therapy    OT Treatment    Patient Name: Jen Daly  MRN: [id number]  Department: [facility] REHAB  Room: [address] [place] [address]  Today's Date: 3/27/2025  Time Calculation  Start Time: 1045  Stop Time: 1130  Time Calculation (min): 45 min        Assessment:  End of Session Communication: Bedside nurse  End of Session Patient Position: Up in chair, Alarm on     Plan:  Treatment Interventions: ADL retraining, UE strengthening/ROM, Functional transfer training, Endurance training, Patient/family training, Equipment evaluation/education  OT Frequency: 90 min/5 days per week (x 1 week)  OT Discharge Recommendations:  (anticipate need for sba/cga for transfers, mobility, ADLs; intermittent assist for higher level IADLs)  Equipment Recommended upon Discharge: Wheeled walker (3:1 commode, shower seat, sock aid, reacher)  Treatment Interventions: ADL retraining, UE strengthening/ROM, Functional transfer training, Endurance training, Patient/family training, Equipment evaluation/education    Subjective   Previous Visit Info:     General:  General  Prior to Session Communication:  (hand off from PT)  Patient Position Received: Alarm on, Up in chair  Precautions:  LE Weight Bearing Status: Weight Bearing as Tolerated  Medical Precautions: Fall precautions  Precautions Comment: Right TKR    Pain:  Pain Assessment  Pain Assessment: 0-10  0-10 (Numeric) Pain Score: 6  Pain Type: Surgical pain  Pain Location: Knee  Pain Orientation: Right    Objective      Therapy/Activity: Therapeutic Exercise  Therapeutic Exercise Performed: Yes  BUE exercises using one pound weights completing 30 repetitions of 8 exercises to promote greater independence with ADL's and transfers.      EDUCATION:  Education  Individual(s) Educated: Patient  Education Provided: Fall precautions  Home Program: Strengthening  Patient Response to Education: Patient/Caregiver Verbalized Understanding of Information, Patient/Caregiver Performed Return Demonstration of  Exercises/Activities    Goals:  Encounter Problems       Encounter Problems (Active)       OT Problem       LTG - Patient will complete grooming independently.  (Progressing)       Start:  03/22/25    Expected End:  04/03/25            LTG - Patient will complete toileting with sba. (Progressing)       Start:  03/22/25    Expected End:  04/03/25            LTG - Patient will complete bathing with sba. (Progressing)       Start:  03/22/25    Expected End:  04/03/25            LTG - Patient will complete UE dressing with set up. (Progressing)       Start:  03/22/25    Expected End:  04/03/25            LTG - Patient will complete LE dressing using adaptive equip as needed with sba. (Progressing)       Start:  03/22/25    Expected End:  04/03/25            LTG - Patient will complete commode transfer via device as needed with sba. (Progressing)       Start:  03/22/25    Expected End:  04/03/25            LTG - Patient will complete shower transfer using DME as needed with cga. (Progressing)       Start:  03/22/25    Expected End:  04/03/25            LTG - Patient will complete functional mobility via device as needed with sba. (Progressing)       Start:  03/22/25    Expected End:  04/03/25

## 2025-03-28 PROCEDURE — 2500000001 HC RX 250 WO HCPCS SELF ADMINISTERED DRUGS (ALT 637 FOR MEDICARE OP): Performed by: PHYSICAL MEDICINE & REHABILITATION

## 2025-03-28 PROCEDURE — 97530 THERAPEUTIC ACTIVITIES: CPT | Mod: GO,CO

## 2025-03-28 PROCEDURE — 97535 SELF CARE MNGMENT TRAINING: CPT | Mod: GO,CO

## 2025-03-28 PROCEDURE — 97110 THERAPEUTIC EXERCISES: CPT | Mod: GP

## 2025-03-28 PROCEDURE — 1180000001 HC REHAB PRIVATE ROOM DAILY

## 2025-03-28 PROCEDURE — 2500000002 HC RX 250 W HCPCS SELF ADMINISTERED DRUGS (ALT 637 FOR MEDICARE OP, ALT 636 FOR OP/ED): Performed by: PHYSICAL MEDICINE & REHABILITATION

## 2025-03-28 PROCEDURE — 97530 THERAPEUTIC ACTIVITIES: CPT | Mod: GP

## 2025-03-28 PROCEDURE — 97116 GAIT TRAINING THERAPY: CPT | Mod: GP

## 2025-03-28 PROCEDURE — 99221 1ST HOSP IP/OBS SF/LOW 40: CPT | Performed by: PODIATRIST

## 2025-03-28 PROCEDURE — 99233 SBSQ HOSP IP/OBS HIGH 50: CPT | Performed by: PHYSICAL MEDICINE & REHABILITATION

## 2025-03-28 RX ORDER — PANTOPRAZOLE SODIUM 40 MG/1
40 TABLET, DELAYED RELEASE ORAL DAILY PRN
Status: DISCONTINUED | OUTPATIENT
Start: 2025-03-28 | End: 2025-03-30 | Stop reason: HOSPADM

## 2025-03-28 RX ADMIN — METOPROLOL TARTRATE 50 MG: 50 TABLET, FILM COATED ORAL at 20:36

## 2025-03-28 RX ADMIN — APIXABAN 2.5 MG: 2.5 TABLET, FILM COATED ORAL at 08:31

## 2025-03-28 RX ADMIN — APIXABAN 2.5 MG: 2.5 TABLET, FILM COATED ORAL at 20:35

## 2025-03-28 RX ADMIN — ACETAMINOPHEN 650 MG: 325 TABLET, FILM COATED ORAL at 23:18

## 2025-03-28 RX ADMIN — OXYCODONE HYDROCHLORIDE 5 MG: 5 TABLET ORAL at 15:25

## 2025-03-28 RX ADMIN — ACETAMINOPHEN 650 MG: 325 TABLET, FILM COATED ORAL at 14:30

## 2025-03-28 RX ADMIN — Medication 1 TABLET: at 08:31

## 2025-03-28 RX ADMIN — ACETAMINOPHEN 650 MG: 325 TABLET, FILM COATED ORAL at 08:31

## 2025-03-28 RX ADMIN — VALSARTAN 320 MG: 80 TABLET, FILM COATED ORAL at 09:08

## 2025-03-28 RX ADMIN — METOPROLOL TARTRATE 50 MG: 50 TABLET, FILM COATED ORAL at 08:31

## 2025-03-28 ASSESSMENT — PAIN SCALES - GENERAL
PAINLEVEL_OUTOF10: 6
PAINLEVEL_OUTOF10: 5 - MODERATE PAIN
PAINLEVEL_OUTOF10: 5 - MODERATE PAIN
PAINLEVEL_OUTOF10: 0 - NO PAIN
PAINLEVEL_OUTOF10: 5 - MODERATE PAIN
PAINLEVEL_OUTOF10: 6
PAINLEVEL_OUTOF10: 4
PAINLEVEL_OUTOF10: 6
PAINLEVEL_OUTOF10: 0 - NO PAIN
PAINLEVEL_OUTOF10: 4
PAINLEVEL_OUTOF10: 4
PAINLEVEL_OUTOF10: 6

## 2025-03-28 ASSESSMENT — PAIN DESCRIPTION - ORIENTATION
ORIENTATION: RIGHT

## 2025-03-28 ASSESSMENT — PAIN - FUNCTIONAL ASSESSMENT
PAIN_FUNCTIONAL_ASSESSMENT: 0-10

## 2025-03-28 ASSESSMENT — COGNITIVE AND FUNCTIONAL STATUS - GENERAL
CLIMB 3 TO 5 STEPS WITH RAILING: TOTAL
DRESSING REGULAR UPPER BODY CLOTHING: A LITTLE
MOVING FROM LYING ON BACK TO SITTING ON SIDE OF FLAT BED WITH BEDRAILS: A LITTLE
DRESSING REGULAR LOWER BODY CLOTHING: A LITTLE
MOVING TO AND FROM BED TO CHAIR: A LOT
HELP NEEDED FOR BATHING: A LOT
PERSONAL GROOMING: A LITTLE
TURNING FROM BACK TO SIDE WHILE IN FLAT BAD: A LITTLE
WALKING IN HOSPITAL ROOM: TOTAL
STANDING UP FROM CHAIR USING ARMS: A LOT
DAILY ACTIVITIY SCORE: 17
TOILETING: A LOT
MOBILITY SCORE: 12

## 2025-03-28 ASSESSMENT — PAIN DESCRIPTION - LOCATION
LOCATION: KNEE

## 2025-03-28 ASSESSMENT — PAIN DESCRIPTION - DESCRIPTORS: DESCRIPTORS: ACHING

## 2025-03-28 ASSESSMENT — ACTIVITIES OF DAILY LIVING (ADL): HOME_MANAGEMENT_TIME_ENTRY: 20

## 2025-03-28 NOTE — CONSULTS
PODIATRY CONSULT NOTE    SERVICE DATE: 3/28/2025   SERVICE TIME:  1030    REASON FOR CONSULT: Nail care  REQUESTING PHYSICIAN: Alanis Soni MD   PRIMARY CARE PHYSICIAN: Evert Dempsey DO    Subjective   HPI:  Patient Jen Daly is a 75 y.o. female presenting with a right total knee replacement on 3/18 at [facility].  Postoperatively she felt very weak like her right leg was going to give out on her.  She did have multiple falls after her left knee replacement in November 2024.  She also had some mild generalized redness of her right knee and was started on doxycycline for this.  She was hospitalized for 3 days and had a pain infusion, and is having difficulty with pain control while ambulating.  She is admitted for rehabilitation to improve her weightbearing status, pain management, as well as for strengthening and range of motion exercises.     Podiatry consulted for routine nail care. Patient has painful nails that are irritated in shoes. Patient can't reach her toes due to recent total knee replacement.    ROS: 10-point review of systems was performed and is otherwise negative except as noted in HPI.  PMH: Reviewed/documented below.  PSH:  Noncontributory except per HPI   FH: Reviewed and noncontributory   SOCIAL:  Reviewed/documented below.  ALLERGIES: Reviewed/documented below.  MEDS: Reviewed/documented below.  VS: Reviewed/documented below.    History reviewed. No pertinent past medical history.  History reviewed. No pertinent surgical history.  Family History   Problem Relation Name Age of Onset    Colon cancer Father       Social History     Tobacco Use    Smoking status: Never    Smokeless tobacco: Never   Substance Use Topics    Alcohol use: Never    Drug use: Never      Medications Prior to Admission   Medication Sig Dispense Refill Last Dose/Taking    acetaminophen (Tylenol 8 HOUR) 650 mg ER tablet Take 1,000 mg by mouth every 8 hours. Do not crush, chew, or split.    3/21/2025 at  8:56 PM    apixaban (Eliquis) 2.5 mg tablet Take 1 tablet (2.5 mg) by mouth 2 times a day.   3/21/2025 at  8:56 PM    ascorbic acid (Vitamin C) 500 mg ER capsule Take 1 capsule (500 mg) by mouth 2 times a day before meals.   3/20/2025 at  5:43 PM    candesartan (Atacand) 32 mg tablet TAKE 1 TABLET (32 MG) BY MOUTH ONCE DAILY. 90 tablet 0 3/21/2025 at  8:42 AM    doxycycline (Adoxa) 100 mg tablet Take 1 tablet (100 mg) by mouth 2 times a day. Take with a full glass of water and do not lie down for at least 30 minutes after   3/21/2025 at  5:43 PM    hydrALAZINE (Apresoline) 50 mg tablet Take 1 tablet (50 mg) by mouth if needed (Hold for SBP <130).   3/21/2025 at  8:42 AM    methocarbamol (Robaxin) 750 mg tablet Take 1 tablet (750 mg) by mouth 3 times a day as needed for muscle spasms.   3/21/2025 at  8:55 PM    metoprolol tartrate (Lopressor) 50 mg tablet Take 1 tablet by mouth 2 times a day. 180 tablet 1 3/21/2025 at  8:55 PM    oxyCODONE (Oxy-IR) 5 mg immediate release capsule Take 1 capsule (5 mg) by mouth every 3 (three) hours.   3/21/2025 at  5:43 PM    calcium carbonate-vitamin D3 (Caltrate with Vitamin D3) 600 mg-20 mcg (800 unit) tablet Take by mouth.       clonazePAM (KlonoPIN) 1 mg tablet Take 1 tablet (1 mg) by mouth 2 times a day as needed for anxiety. 60 tablet 2         Medications:  Scheduled Meds: apixaban, 2.5 mg, oral, BID  ascorbic acid, 500 mg, oral, BID  calcium carbonate-vitamin D3, 1 tablet, oral, Daily  docusate sodium, 100 mg, oral, BID  metoprolol tartrate, 50 mg, oral, BID  valsartan, 320 mg, oral, Daily      Continuous Infusions:    PRN Meds: PRN medications: acetaminophen, clonazePAM, hydrALAZINE, melatonin, methocarbamol, oxyCODONE, oxyCODONE, pantoprazole    Allergies as of 03/21/2025 - Reviewed 10/31/2024   Allergen Reaction Noted    Aspirin Unknown 03/10/2023    Salicylates Other 10/22/2007            Objective   PHYSICAL EXAM:  Physical Exam Performed:  Vitals:     "03/28/25 1500   BP: 147/70   Pulse: 85   Resp: 19   Temp: 36.5 °C (97.7 °F)   SpO2:      Body mass index is 36.26 kg/m².    Patient is AOx3 and in no acute distress. Patient is alert and cooperative. Sitting comfortably in bed with dressing clean, dry and intact.     Vascular: Palpable DP/PT pulses B/L. Mild non-pitting edema noted left. Hair growth absent B/L. CFT<5 to B/L hallux. Temperature is warm to cool from tibial tuberosity to distal digits B/L. No lymphatic streaking noted B/L.    Musculoskeletal: Gross active and passive ROM diminished to age and activity level. Moves all extremities spontaneously. No pain to palpation at feet B/L. -    Neurological: Intact light touch sensation B/L. Pain stimuli intact B/L.     Dermatologic: Nails 1-5 are thickened, elongated, discolored with subungual debris B/L. Skin appears diffusely xerotic B/L. Web spaces 1-4 B/L are clean, dry and intact. No rashes or nodules noted B/L. No hyperkeratotic tissue noted B/L.       LABS:   No results found for this or any previous visit (from the past 24 hours).   Lab Results   Component Value Date    HGBA1C 4.8 03/21/2025      No results found for: \"CRP\"   No results found for: \"SEDRATE\"     Results from last 7 days   Lab Units 03/22/25  0620   WBC AUTO x10*3/uL 10.4   RBC AUTO x10*6/uL 3.79*   HEMOGLOBIN g/dL 10.2*   HEMATOCRIT % 34.2*     Results from last 7 days   Lab Units 03/22/25  0620   SODIUM mmol/L 141   POTASSIUM mmol/L 4.0   CHLORIDE mmol/L 104   CO2 mmol/L 31   BUN mg/dL 14   CREATININE mg/dL 0.53   CALCIUM mg/dL 8.4*           IMAGING REVIEW:  XR knee right 1-2 views    Result Date: 3/20/2025  * * *Final Report* * * DATE OF EXAM: Mar 18 2025  3:51PM   HCR   [id number]  -  XR KNEE 2V AP/LAT RT  / ACCESSION #  [id number] PROCEDURE REASON: Post Operative Assessment      * * * * Physician Interpretation * * * * RESULT: EXAMINATION / TECHNIQUE:  XR KNEE 2V AP/LAT RT PATIENT/TECHNOLOGIST PROVIDED HISTORY:   POST OP RT KNEE FOR KNEE " REPLACEMENT CLINICAL INFORMATION (AS PROVIDED BY ORDERING CLINICIAN) :  Post Operative Assessment COMPARISON: 11/12/2024 RESULT: Right total knee arthroplasty with patellar resurfacing. There is no periprosthetic fracture or lucency. No acute fracture or dislocation. No malalignment. Postoperative soft tissue air and swelling.  Surgical skin staples.    IMPRESSION: Arthroplasty without complication Transcribed Using Voice Recognition Transcribe Date/Time: Mar 20 2025 11:26A Dictated by: AZAR GURROLA MD This examination was interpreted and the report reviewed and electronically signed by: AZAR GURROLA MD on Mar 20 2025 11:27AM  EST            Assessment/Plan   Patient Jen Daly is a 75 y.o. female presenting with a right total knee replacement on 3/18 at [facility].     #Onychomycosis B/L  #Limited mobility 2/2 post-op state right TKR   #foot pain    - Patient was seen and evaluated; all findings were discussed and all questions were answered to patient's satisfaction.  - Charts, labs, vitals and imaging all reviewed.   - Imaging: reviewed  - Labs: reviewed    Plan:  - Pain Regimen: reviewed  - Nursing staff is able to change/reinforce dressing if & as necessary until next day’s dressing change. Thank you.  - Podiatry will continue to follow while in house.    Case to be discussed with attending, A&P above reflects a tentative plan. Please await for the final signature from the attending physician on service.    Raleigh Mcfadden, DPM/PGY-3  Podiatric Medicine & Surgery  Please Haiku message me with any questions or concerns.      I saw and evaluated the patient. I personally obtained the key and critical portions of the history and physical exam or was physically present for key and critical portions performed by the resident/fellow. I reviewed the resident/fellow's documentation and discussed the patient with the resident/fellow. I agree with the resident/fellow's medical decision making as  documented in the note.    Sari Donnelly DPM       SIGNATURE: Raleigh Mcfadden DPM PATIENT NAME: Jen Daly   DATE: March 28, 2025 MRN: [id number]   TIME: 5:24 PM CONTACT: Haiku message

## 2025-03-28 NOTE — PROGRESS NOTES
Occupational Therapy    OT Treatment    Patient Name: Jen Daly  MRN: [id number]  Department: [facility] REHAB  Room: [address] [place] [address]  Today's Date: 3/28/2025  Time Calculation  Start Time: 1000  Stop Time: 1045  Time Calculation (min): 45 min        Assessment:  End of Session Communication: Bedside nurse  End of Session Patient Position: Up in chair, Alarm on     Plan:  Treatment Interventions: ADL retraining, UE strengthening/ROM, Functional transfer training, Endurance training, Patient/family training, Equipment evaluation/education  OT Frequency: 90 min/5 days per week (x 1 week)  OT Discharge Recommendations:  (anticipate need for sba/cga for transfers, mobility, ADLs; intermittent assist for higher level IADLs)  Equipment Recommended upon Discharge: Wheeled walker (3:1 commode, shower seat, sock aid, reacher)  Treatment Interventions: ADL retraining, UE strengthening/ROM, Functional transfer training, Endurance training, Patient/family training, Equipment evaluation/education    Subjective   Previous Visit Info:  OT Last Visit  OT Received On: 03/28/25  General:  General  Patient Position Received: Alarm on, Up in chair  Precautions:  LE Weight Bearing Status: Weight Bearing as Tolerated  Medical Precautions: Fall precautions  Post-Surgical Precautions: Right total knee precautions  Pain:  Pain Assessment  Pain Assessment: 0-10  0-10 (Numeric) Pain Score: 4  Pain Type: Surgical pain  Pain Location: Knee  Pain Orientation: Right  Clinical Progression: Not changed    Objective      LE Dressing  LE Dressing: Yes  LE Dressing Adaptive Equipment: Reacher, Sock aide, Dressing stick  Pants Level of Assistance:  (SBA)  Sock Level of Assistance: Close supervision  LE Dressing Where Assessed: Wheelchair    Toileting  Toileting Level of Assistance: Moderate assistance  Where Assessed: Toilet     Bed Mobility/Transfers: Transfer 1  Trials/Comments 1: sit to stand at SBA    Toilet Transfers  Toilet Transfer to: Standard bedside  commode  Toilet Transfer Technique: Ambulating  Toilet Transfers:  (CGA/SBA)    Functional Mobility:  Functional Mobility  Functional Mobility Performed: Yes  Functional Mobility 1  Comments 1: via a rolling walker completes simple functional mobility around environmental barriers at  CGA/SBA  EDUCATION:  Education  Individual(s) Educated: Patient  Education Provided: Fall precautions  Patient Response to Education: Patient/Caregiver Verbalized Understanding of Information, Patient/Caregiver Performed Return Demonstration of Exercises/Activities    Goals:  Encounter Problems       Encounter Problems (Active)       OT Problem       LTG - Patient will complete grooming independently.  (Progressing)       Start:  03/22/25    Expected End:  04/03/25            LTG - Patient will complete toileting with sba. (Progressing)       Start:  03/22/25    Expected End:  04/03/25            LTG - Patient will complete bathing with sba. (Progressing)       Start:  03/22/25    Expected End:  04/03/25            LTG - Patient will complete UE dressing with set up. (Progressing)       Start:  03/22/25    Expected End:  04/03/25            LTG - Patient will complete LE dressing using adaptive equip as needed with sba. (Progressing)       Start:  03/22/25    Expected End:  04/03/25            LTG - Patient will complete commode transfer via device as needed with sba. (Progressing)       Start:  03/22/25    Expected End:  04/03/25            LTG - Patient will complete shower transfer using DME as needed with cga. (Progressing)       Start:  03/22/25    Expected End:  04/03/25            LTG - Patient will complete functional mobility via device as needed with sba. (Progressing)       Start:  03/22/25    Expected End:  04/03/25

## 2025-03-28 NOTE — PROGRESS NOTES
Occupational Therapy    OT Treatment    Patient Name: Jen Daly  MRN: [id number]  Department: [facility] REHAB  Room: [address] [place] [address]  Today's Date: 3/28/2025  Time Calculation  Start Time: 1300  Stop Time: 1323  Time Calculation (min): 23 min        Assessment:  End of Session Communication: Bedside nurse  End of Session Patient Position: Up in chair, Alarm on     Plan:  Treatment Interventions: ADL retraining, UE strengthening/ROM, Functional transfer training, Endurance training, Patient/family training, Equipment evaluation/education  OT Frequency: 90 min/5 days per week (x 1 week)  OT Discharge Recommendations:  (anticipate need for sba/cga for transfers, mobility, ADLs; intermittent assist for higher level IADLs)  Equipment Recommended upon Discharge: Wheeled walker (3:1 commode, shower seat, sock aid, reacher)  Treatment Interventions: ADL retraining, UE strengthening/ROM, Functional transfer training, Endurance training, Patient/family training, Equipment evaluation/education    Subjective   Previous Visit Info:  OT Last Visit  OT Received On: 03/28/25  General:  General  Family/Caregiver Present: Yes (son and spouse)  Patient Position Received: Alarm on, Up in chair  Precautions:  LE Weight Bearing Status: Weight Bearing as Tolerated  Medical Precautions: Fall precautions  Post-Surgical Precautions: Right total knee precautions            Pain:  Pain Assessment  Pain Assessment: 0-10  0-10 (Numeric) Pain Score: 0 - No pain  Pain Type: Surgical pain  Pain Location: Knee  Pain Orientation: Right  Clinical Progression: Not changed    Objective    Bed Mobility/Transfers: Transfer 1  Trials/Comments 1: sit to stand transfers at SBA  Functional Mobility:  Functional Mobility  Functional Mobility Performed: Yes  Functional Mobility 1  Comments 1: functional mobility task via a rolling walker around environmental barriers with SBA     EDUCATION/Therapy/Activity:  Patient, spouse, and son participated in family teaching for  home going regarding transfers, DME, AE, ADL's, and functional mobility. Patient and family expressed good understanding and family demonstrated good ability to assist patient at discharge. Handouts were given for DME and retail options.       Goals:  Encounter Problems       Encounter Problems (Active)       OT Problem       LTG - Patient will complete grooming independently.  (Progressing)       Start:  03/22/25    Expected End:  04/03/25            LTG - Patient will complete toileting with sba. (Progressing)       Start:  03/22/25    Expected End:  04/03/25            LTG - Patient will complete bathing with sba. (Progressing)       Start:  03/22/25    Expected End:  04/03/25            LTG - Patient will complete UE dressing with set up. (Progressing)       Start:  03/22/25    Expected End:  04/03/25            LTG - Patient will complete LE dressing using adaptive equip as needed with sba. (Progressing)       Start:  03/22/25    Expected End:  04/03/25            LTG - Patient will complete commode transfer via device as needed with sba. (Progressing)       Start:  03/22/25    Expected End:  04/03/25            LTG - Patient will complete shower transfer using DME as needed with cga. (Progressing)       Start:  03/22/25    Expected End:  04/03/25            LTG - Patient will complete functional mobility via device as needed with sba. (Progressing)       Start:  03/22/25    Expected End:  04/03/25

## 2025-03-28 NOTE — CARE PLAN
The patient's goals for the shift include  adequate rest in between rehab    The clinical goals for the shift include patient will remain free from falls    Over the shift, the patient did not make progress toward the following goals. Barriers to progression include BLE weakness and pain. Recommendations to address these barriers include pain management.

## 2025-03-28 NOTE — PROGRESS NOTES
Physical Therapy    Physical Therapy Treatment    Patient Name: Jen Daly  MRN: [id number]  Department: [facility] REH[facility]  Room: [address] [place] [address]  Today's Date: 3/28/2025  Time Calculation  Start Time: 0915  Stop Time: 1000  Time Calculation (min): 45 min         Assessment/Plan         PT Plan  Treatment/Interventions: Bed mobility, Transfer training, Gait training, Balance training, Neuromuscular re-education, Strengthening, Endurance training, Therapeutic exercise, Therapeutic activity, Home exercise program, Stair training  PT Plan: Ongoing PT  PT Frequency: 90 min/5 days per week (for 1 week)  PT Discharge Recommendations: Other (comment) (Anticipate discharge home at walker level with recommendaton for SBA with intermittent assist for high level tasks)  Equipment Recommended upon Discharge: Wheeled walker  PT Recommended Transfer Status: Assist x2      General Visit Information:   PT  Visit  PT Received On: 03/28/25  General  Patient Position Received: Alarm on, Up in chair  General Comment: Requires reassurance to increase effort    Subjective   Precautions:  Precautions  LE Weight Bearing Status: Weight Bearing as Tolerated (RLE)  Medical Precautions: Fall precautions  Post-Surgical Precautions: Right total knee precautions        Objective   Pain:  Pain Assessment  Pain Assessment: 0-10  0-10 (Numeric) Pain Score: 6  Pain Type: Surgical pain  Pain Location: Knee  Pain Orientation: Right  Pain Interventions: Medication (See MAR), Ambulation/increased activity, Distraction, Emotional support  Cognition:  Cognition  Orientation Level: Oriented X4     Treatments:  Therapeutic Exercise  Therapeutic Exercise Performed:  (Pt performed seated BLE ther ex 2 x 10 reps each.  Ex performed to improve ROM and strength.  Effort seem sub-max at times.)    Ambulation/Gait Training  Ambulation/Gait Training Performed:  (Pt amb 50 ft with wheeled walker and CGA.  Gait is slow and effortful.)    Transfers  Transfer:  (Repeated sit to  stand transfer training with CGA.)    Stairs  Stairs:  (Pt negotiated 2- 6 inch curb steps with walker and mod assist.  Assistance needed to lift self up onto step using LLE.)    EDUCATION:  Education Comment:  (Pt educated on recovery process, pain mgmt techniques and goals.)    Encounter Problems       Encounter Problems (Active)       PT Problem       LTG - Pt will perform bed mobility with SBA  (Progressing)       Start:  03/22/25    Expected End:  03/29/25            LTG - Pt will perform transfers with SBA.  (Progressing)       Start:  03/22/25    Expected End:  03/29/25            LTG - Pt will amb 30 feet with FWW and SBA.   (Progressing)       Start:  03/22/25    Expected End:  03/29/25            LTG - Pt will up/down 1+1 stairs with device and min/mod A x 2 (Progressing)       Start:  03/22/25    Expected End:  03/29/25               Pain - Adult

## 2025-03-28 NOTE — PROGRESS NOTES
Physical Therapy    Physical Therapy Treatment    Patient Name: Jen Daly  MRN: [id number]  Department: [facility]  Room: [address] [place] [address]  Today's Date: 3/28/2025  Time Calculation  Start Time: 1045  Stop Time: 1130  Time Calculation (min): 45 min       Assessment/Plan         PT Plan  Treatment/Interventions: Bed mobility, Transfer training, Gait training, Balance training, Neuromuscular re-education, Strengthening, Endurance training, Therapeutic exercise, Therapeutic activity, Home exercise program, Stair training  PT Plan: Ongoing PT  PT Frequency: 90 min/5 days per week (for 1 week)  PT Discharge Recommendations: Other (comment) (Anticipate discharge home at walker level with recommendaton for SBA with intermittent assist for high level tasks)  Equipment Recommended upon Discharge: Wheeled walker  PT Recommended Transfer Status: Assist x2      General Visit Information:   PT  Visit  PT Received On: 03/28/25  General  Patient Position Received: Alarm on, Up in chair  General Comment: Requires encouragement to increase effort    Subjective   Precautions:  Precautions  LE Weight Bearing Status: Weight Bearing as Tolerated (RLE)  Medical Precautions: Fall precautions  Post-Surgical Precautions: Right total knee precautions        Objective   Pain:  Pain Assessment  Pain Assessment: 0-10  0-10 (Numeric) Pain Score: 6  Pain Type: Surgical pain  Pain Location: Knee  Pain Orientation: Right  Pain Interventions: Medication (See MAR), Repositioned, Ambulation/increased activity, Distraction  Cognition:  Cognition  Orientation Level: Oriented X4    Treatments:  Therapeutic Exercise  Therapeutic Exercise Performed:  (Pt performed supine TKR mat ex program 1-2 x10 reps each with intermittent assist due to pain and weakness. Ex performed to improve strength and ROM.)    Bed Mobility  Bed Mobility:  (Bed mobility training:  Sit to supine with SBA using leg  at RLE.  Supine to sit mod independent.)    Ambulation/Gait  Training  Ambulation/Gait Training Performed:  (Pt amb 50 ft with wheeled walker and SBA.)    Transfers  Transfer:  (Sit to stand mod independent.  Pivot transfers with walker and supervision.)    Stairs  Stairs:  (Pt negotiated 5 stairs with bilateral rails and min assist.)    EDUCATION:  Education Comment:  (Pt educated on proper positioning of RLE in bed to allow for improved ROM at right knee.)    Encounter Problems       Encounter Problems (Active)       PT Problem       LTG - Pt will perform bed mobility with SBA  (Progressing)       Start:  03/22/25    Expected End:  03/29/25            LTG - Pt will perform transfers with SBA.  (Progressing)       Start:  03/22/25    Expected End:  03/29/25            LTG - Pt will amb 30 feet with FWW and SBA.   (Progressing)       Start:  03/22/25    Expected End:  03/29/25            LTG - Pt will up/down 1+1 stairs with device and min/mod A x 2 (Progressing)       Start:  03/22/25    Expected End:  03/29/25               Pain - Adult

## 2025-03-28 NOTE — PROGRESS NOTES
Physical Therapy    Physical Therapy Treatment    Patient Name: Jen Daly  MRN: [id number]  Department: [facility]  Room: [address] [place], [place] [address]  Today's Date: 3/28/2025  Time Calculation  Start Time: 1323  Stop Time: 1346  Time Calculation (min): 23 min         Assessment/Plan         PT Plan  Treatment/Interventions: Bed mobility, Transfer training, Gait training, Balance training, Neuromuscular re-education, Strengthening, Endurance training, Therapeutic exercise, Therapeutic activity, Home exercise program, Stair training  PT Plan: Ongoing PT  PT Frequency: 90 min/5 days per week (for 1 week)  PT Discharge Recommendations: Other (comment) (Anticipate discharge home at walker level with recommendaton for SBA with intermittent assist for high level tasks)  Equipment Recommended upon Discharge: Wheeled walker  PT Recommended Transfer Status: Assist x2      General Visit Information:   PT  Visit  PT Received On: 03/28/25  General  Family/Caregiver Present:  (Pt's spouse and son present for education and hands-on training.)  Patient Position Received: Alarm on, Up in chair  General Comment: Requires encouragement to increase effort    Subjective   Precautions:  Precautions  LE Weight Bearing Status: Weight Bearing as Tolerated (RLE)  Medical Precautions: Fall precautions  Post-Surgical Precautions: Right total knee precautions        Objective   Pain:  Pain Assessment  Pain Assessment: 0-10  0-10 (Numeric) Pain Score: 5 - Moderate pain  Pain Type: Surgical pain  Pain Location: Knee  Pain Orientation: Right  Pain Interventions: Medication (See MAR), Ambulation/increased activity, Distraction, Emotional support  Cognition:  Cognition  Orientation Level: Oriented X4    Treatments:  Bed Mobility  Bed Mobility:  (Bed mobility training on elevated bed as per home environment.  Pt and famlily educated in and performed approach steps to bed while stepping up onto 4 inch step backwards with walker and min assist.  Family already owns 4  inch step.  Sit to supine with SBA with use of leg .  Supine to sit with SBA.)    Ambulation/Gait Training  Ambulation/Gait Training Performed:  (Pt amb 40 ft with wheeled walker and SBA.  Family present and educated on pt's mobility level.)    Transfers  Transfer:  (Sit to stand transfer training with SBA.  Family present and aware.)    Stairs  Stairs:  (Pt has 2 - 8 inch curb steps into home.  Pt negotiated an 8 inch curb step using one 4 inch step to decrease the height of the large step.  Pt negotiating these steps with walker and min assist x1.  Family present and hands-on.)    EDUCATION:  Education Comment:  (Pt and family educated on pt's condition, recovery process, rehab process, safe mobility techniques, bed mobility, transfers, gait with walker, curb step negotiation, home-going needs and home safety.)    Encounter Problems       Encounter Problems (Active)       PT Problem       LTG - Pt will perform bed mobility with SBA  (Progressing)       Start:  03/22/25    Expected End:  03/29/25            LTG - Pt will perform transfers with SBA.  (Progressing)       Start:  03/22/25    Expected End:  03/29/25            LTG - Pt will amb 30 feet with FWW and SBA.   (Progressing)       Start:  03/22/25    Expected End:  03/29/25            LTG - Pt will up/down 1+1 stairs with device and min/mod A x 2 (Progressing)       Start:  03/22/25    Expected End:  03/29/25               Pain - Adult

## 2025-03-28 NOTE — PROGRESS NOTES
"  Jen Daly is a 75 y.o. female on day 7 of admission presenting with History of total right knee replacement (TKR).    Subjective   Patient was seen today in the room.  She appreciated having her nails cut.  She is still anxious about going up and down stairs and walking because of prior history of falls primarily on the left knee.    Reassurance was issued today.  She had family training today to help support her at home.  She does have supports at home.  We did talk about being smart once being discharged home.    She denied chest pain or shortness of breath.  She is able to initiate active movement independently against gravity of the left lower extremity at the knee and hip.       Objective     Pleasant female no apparent distress alert and oriented x 3  Chest clear to auscultation bilaterally  Physical Exam    Cardiovascular S1-S2  Abdomen soft nontender nondistended with active bowel sounds  Negative Homans bilaterally  Right knee with Aquacel dressing in place.  She is able to initiate extension at the knee in a sitting position.      Function: Standby assist overall.    Assessment/Plan        Last Recorded Vitals  Blood pressure 154/62, pulse 77, temperature 36.1 °C (97 °F), resp. rate 18, height 1.549 m (5' 0.98\"), weight 87 kg (191 lb 12.8 oz), SpO2 95%.    Therapy notes from last 24 hours reviewed.    Relevant Results                  Scheduled medications  apixaban, 2.5 mg, oral, BID  ascorbic acid, 500 mg, oral, BID  calcium carbonate-vitamin D3, 1 tablet, oral, Daily  docusate sodium, 100 mg, oral, BID  metoprolol tartrate, 50 mg, oral, BID  valsartan, 320 mg, oral, Daily      Continuous medications     PRN medications  PRN medications: acetaminophen, clonazePAM, hydrALAZINE, melatonin, methocarbamol, oxyCODONE, oxyCODONE, pantoprazole     No results found for this or any previous visit (from the past 24 hours).              Assessment/Plan   Assessment & Plan  History of total right knee " replacement (TKR)  -3 hours of PT and OT daily  -Goals go home modified independent      Redness and swelling of knee  -Follow right knee status post surgical intervention  Anxiety  -Manage anxiety developer for fill goals and rehab  -As needed use of clonazepam    Benign secondary hypertension  -Monitor blood pressure.  Currently on metoprolol and Diovan.  -As needed use of hydralazine    Obesity, morbid (Multi)  -Monitor daily intake        3/28  -Patient progressed to a standby assist level  -Ready for discharge home this weekend  -Continue Eliquis 2.5 mg twice daily for DVT prophylaxis  -Blood pressure remaining stable on valsartan and metoprolol  -Recheck CBC and BMP.  -Discharge planning after family training today.       I spent 35 minutes in the professional and overall care of this patient.      Alanis Soni MD

## 2025-03-29 LAB
ANION GAP SERPL CALC-SCNC: 10 MMOL/L (ref 10–20)
BASOPHILS # BLD AUTO: 0.07 X10*3/UL (ref 0–0.1)
BASOPHILS NFR BLD AUTO: 0.6 %
BUN SERPL-MCNC: 14 MG/DL (ref 6–23)
CALCIUM SERPL-MCNC: 9 MG/DL (ref 8.6–10.3)
CHLORIDE SERPL-SCNC: 105 MMOL/L (ref 98–107)
CO2 SERPL-SCNC: 30 MMOL/L (ref 21–32)
CREAT SERPL-MCNC: 0.49 MG/DL (ref 0.5–1.05)
EGFRCR SERPLBLD CKD-EPI 2021: >90 ML/MIN/1.73M*2
EOSINOPHIL # BLD AUTO: 0.42 X10*3/UL (ref 0–0.4)
EOSINOPHIL NFR BLD AUTO: 3.7 %
ERYTHROCYTE [DISTWIDTH] IN BLOOD BY AUTOMATED COUNT: 14 % (ref 11.5–14.5)
GLUCOSE SERPL-MCNC: 100 MG/DL (ref 74–99)
HCT VFR BLD AUTO: 36.3 % (ref 36–46)
HGB BLD-MCNC: 10.7 G/DL (ref 12–16)
IMM GRANULOCYTES # BLD AUTO: 0.15 X10*3/UL (ref 0–0.5)
IMM GRANULOCYTES NFR BLD AUTO: 1.3 % (ref 0–0.9)
LYMPHOCYTES # BLD AUTO: 1.92 X10*3/UL (ref 0.8–3)
LYMPHOCYTES NFR BLD AUTO: 17.1 %
MCH RBC QN AUTO: 27 PG (ref 26–34)
MCHC RBC AUTO-ENTMCNC: 29.5 G/DL (ref 32–36)
MCV RBC AUTO: 91 FL (ref 80–100)
MONOCYTES # BLD AUTO: 0.74 X10*3/UL (ref 0.05–0.8)
MONOCYTES NFR BLD AUTO: 6.6 %
NEUTROPHILS # BLD AUTO: 7.95 X10*3/UL (ref 1.6–5.5)
NEUTROPHILS NFR BLD AUTO: 70.7 %
NRBC BLD-RTO: 0 /100 WBCS (ref 0–0)
PLATELET # BLD AUTO: 541 X10*3/UL (ref 150–450)
POTASSIUM SERPL-SCNC: 4 MMOL/L (ref 3.5–5.3)
RBC # BLD AUTO: 3.97 X10*6/UL (ref 4–5.2)
SODIUM SERPL-SCNC: 141 MMOL/L (ref 136–145)
WBC # BLD AUTO: 11.3 X10*3/UL (ref 4.4–11.3)

## 2025-03-29 PROCEDURE — 97110 THERAPEUTIC EXERCISES: CPT | Mod: GO

## 2025-03-29 PROCEDURE — 2500000002 HC RX 250 W HCPCS SELF ADMINISTERED DRUGS (ALT 637 FOR MEDICARE OP, ALT 636 FOR OP/ED): Performed by: PHYSICAL MEDICINE & REHABILITATION

## 2025-03-29 PROCEDURE — 97530 THERAPEUTIC ACTIVITIES: CPT | Mod: GP

## 2025-03-29 PROCEDURE — 97116 GAIT TRAINING THERAPY: CPT | Mod: GP

## 2025-03-29 PROCEDURE — 2500000001 HC RX 250 WO HCPCS SELF ADMINISTERED DRUGS (ALT 637 FOR MEDICARE OP): Performed by: PHYSICAL MEDICINE & REHABILITATION

## 2025-03-29 PROCEDURE — 97535 SELF CARE MNGMENT TRAINING: CPT | Mod: GO

## 2025-03-29 PROCEDURE — 85025 COMPLETE CBC W/AUTO DIFF WBC: CPT | Performed by: PHYSICAL MEDICINE & REHABILITATION

## 2025-03-29 PROCEDURE — 97110 THERAPEUTIC EXERCISES: CPT | Mod: GP

## 2025-03-29 PROCEDURE — 36415 COLL VENOUS BLD VENIPUNCTURE: CPT | Performed by: PHYSICAL MEDICINE & REHABILITATION

## 2025-03-29 PROCEDURE — 80048 BASIC METABOLIC PNL TOTAL CA: CPT | Performed by: PHYSICAL MEDICINE & REHABILITATION

## 2025-03-29 PROCEDURE — 1180000001 HC REHAB PRIVATE ROOM DAILY

## 2025-03-29 RX ADMIN — METOPROLOL TARTRATE 50 MG: 50 TABLET, FILM COATED ORAL at 08:02

## 2025-03-29 RX ADMIN — APIXABAN 2.5 MG: 2.5 TABLET, FILM COATED ORAL at 20:11

## 2025-03-29 RX ADMIN — ACETAMINOPHEN 650 MG: 325 TABLET, FILM COATED ORAL at 17:22

## 2025-03-29 RX ADMIN — METOPROLOL TARTRATE 50 MG: 50 TABLET, FILM COATED ORAL at 20:11

## 2025-03-29 RX ADMIN — Medication 1 TABLET: at 08:02

## 2025-03-29 RX ADMIN — DOCUSATE SODIUM 100 MG: 100 CAPSULE, LIQUID FILLED ORAL at 08:02

## 2025-03-29 RX ADMIN — ACETAMINOPHEN 650 MG: 325 TABLET, FILM COATED ORAL at 23:22

## 2025-03-29 RX ADMIN — Medication 500 MG: at 08:02

## 2025-03-29 RX ADMIN — ACETAMINOPHEN 650 MG: 325 TABLET, FILM COATED ORAL at 08:03

## 2025-03-29 RX ADMIN — VALSARTAN 320 MG: 80 TABLET, FILM COATED ORAL at 08:02

## 2025-03-29 RX ADMIN — CLONAZEPAM 1 MG: 0.5 TABLET ORAL at 07:43

## 2025-03-29 RX ADMIN — CLONAZEPAM 1 MG: 0.5 TABLET ORAL at 23:23

## 2025-03-29 RX ADMIN — HYDRALAZINE HYDROCHLORIDE 50 MG: 50 TABLET ORAL at 07:43

## 2025-03-29 RX ADMIN — APIXABAN 2.5 MG: 2.5 TABLET, FILM COATED ORAL at 08:02

## 2025-03-29 ASSESSMENT — PAIN SCALES - GENERAL
PAINLEVEL_OUTOF10: 6
PAINLEVEL_OUTOF10: 5 - MODERATE PAIN
PAINLEVEL_OUTOF10: 3
PAINLEVEL_OUTOF10: 0 - NO PAIN
PAINLEVEL_OUTOF10: 6

## 2025-03-29 ASSESSMENT — PAIN - FUNCTIONAL ASSESSMENT
PAIN_FUNCTIONAL_ASSESSMENT: 0-10

## 2025-03-29 ASSESSMENT — PAIN DESCRIPTION - ORIENTATION: ORIENTATION: RIGHT

## 2025-03-29 ASSESSMENT — ACTIVITIES OF DAILY LIVING (ADL): HOME_MANAGEMENT_TIME_ENTRY: 45

## 2025-03-29 ASSESSMENT — PAIN DESCRIPTION - LOCATION: LOCATION: KNEE

## 2025-03-29 NOTE — PROGRESS NOTES
Occupational Therapy    OT Treatment    Patient Name: Jen Daly  MRN: [id number]  Department: [facility] RE[facility]  Room: [address] [place] [address]  Today's Date: 3/29/2025  Time Calculation  Start Time: 1300  Stop Time: 1345  Time Calculation (min): 45 min        Assessment:  End of Session Communication: Bedside nurse  End of Session Patient Position: Up in chair, Alarm on (call light in reach, all needs met)     Plan:  Treatment Interventions: ADL retraining, UE strengthening/ROM, Functional transfer training, Endurance training, Patient/family training, Equipment evaluation/education  OT Frequency: 90 min/5 days per week (x 1 week)  OT Discharge Recommendations:  (anticipate need for sba/cga for transfers, mobility, ADLs; intermittent assist for higher level IADLs)  Equipment Recommended upon Discharge: Wheeled walker (3:1 commode, shower seat, sock aid, reacher)  Treatment Interventions: ADL retraining, UE strengthening/ROM, Functional transfer training, Endurance training, Patient/family training, Equipment evaluation/education    Subjective   Previous Visit Info:  OT Last Visit  OT Received On: 03/29/25  General:  General  Family/Caregiver Present:  (spouse present EOS, supportive)  Prior to Session Communication: Bedside nurse  Patient Position Received: Up in chair, Alarm on  General Comment: patient pleasant and cooperative to participate  Precautions:  LE Weight Bearing Status: Weight Bearing as Tolerated  Medical Precautions: Fall precautions  Post-Surgical Precautions: Right total knee precautions            Pain:  Pain Assessment  Pain Assessment:  (reports pain in R knee; did not rate. provided with cold pack EOS)    Objective      Bed Mobility/Transfers: Transfers  Transfer:  (completed SPT from recliner chair <-->w/c with WW at MOD I)       Therapy/Activity: Therapeutic Exercise  Therapeutic Exercise Performed: Yes  Therapeutic Exercise Activity 1: seated in w/c, completed BUE ther ex, initially with 2 lb free weights,  downgraded to 1 lb free weights due to fatigue. completed 15 reps x 2 for all planes including bicep curls, chest punch, shoulder flexion, chest press, tricep push downs, lateral side raises, shoulder crossovers, and wrist curls. requires therapeutic rest breaks throughout due to fatigue. completing to increase UB strength for ADLs and functional transfers/mobility. provided with handout of HEP for homegoing        Education Documentation  Home Exercise Program, taught by Yin Melendez OT at 3/29/2025  2:06 PM.  Learner: Patient  Readiness: Acceptance  Method: Explanation  Response: Verbalizes Understanding, Needs Reinforcement      Education Comments  No comments found.        OP EDUCATION:       Goals:  Encounter Problems       Encounter Problems (Active)       OT Problem       LTG - Patient will complete grooming independently.  (Progressing)       Start:  03/22/25    Expected End:  04/03/25            LTG - Patient will complete toileting with sba. (Progressing)       Start:  03/22/25    Expected End:  04/03/25            LTG - Patient will complete bathing with sba. (Progressing)       Start:  03/22/25    Expected End:  04/03/25            LTG - Patient will complete UE dressing with set up. (Progressing)       Start:  03/22/25    Expected End:  04/03/25            LTG - Patient will complete LE dressing using adaptive equip as needed with sba. (Progressing)       Start:  03/22/25    Expected End:  04/03/25            LTG - Patient will complete commode transfer via device as needed with sba. (Progressing)       Start:  03/22/25    Expected End:  04/03/25            LTG - Patient will complete shower transfer using DME as needed with cga. (Progressing)       Start:  03/22/25    Expected End:  04/03/25            LTG - Patient will complete functional mobility via device as needed with sba. (Progressing)       Start:  03/22/25    Expected End:  04/03/25

## 2025-03-29 NOTE — PROGRESS NOTES
Physical Therapy    Physical Therapy Treatment    Patient Name: Jen Daly  MRN: [id number]  Department: [facility] RE[facility]  Room: [address] [place], [place] [address]  Today's Date: 3/29/2025  Time Calculation  Start Time: 0830  Stop Time: 0915  Time Calculation (min): 45 min         Assessment/Plan         PT Plan  Treatment/Interventions: Bed mobility, Transfer training, Gait training, Balance training, Neuromuscular re-education, Strengthening, Endurance training, Therapeutic exercise, Therapeutic activity, Home exercise program, Stair training  PT Plan: Ongoing PT  PT Frequency: 90 min/5 days per week (for 1 week)  PT Discharge Recommendations: Other (comment) (Anticipate discharge home at walker level with recommendaton for SBA with intermittent assist for high level tasks)  Equipment Recommended upon Discharge: Wheeled walker  PT Recommended Transfer Status: Assist x2    General Visit Information:   PT  Visit  PT Received On: 03/29/25  General  Family/Caregiver Present:  (Pt's spouse and son present for education and hands-on training.)  Prior to Session Communication:  (OT)  Patient Position Received: Alarm on, Up in chair  General Comment: Pt labile and anxious.  Education and emotional support provided.  Nursing aware.    Subjective   Precautions:  Precautions  LE Weight Bearing Status: Weight Bearing as Tolerated (RLE)  Medical Precautions: Fall precautions  Post-Surgical Precautions: Right total knee precautions        Objective   Pain:  Pain Assessment  Pain Assessment: 0-10  0-10 (Numeric) Pain Score: 6  Pain Type: Surgical pain  Pain Location: Knee  Pain Orientation: Right  Pain Interventions: Repositioned, Ambulation/increased activity, Distraction, Emotional support  Response to Interventions: No change in pain     Treatments:  Therapeutic Exercise  Therapeutic Exercise Performed:  (Pt performed supine TKR mat ex program 1-2 x10 reps each with intermittent assist due to pain and weakness. Ex performed to improve strength and  ROM.)    Ambulation/Gait Training  Ambulation/Gait Training Performed:  (Pt amb 50 ft with wheeled walker and SBA.  Pt reportedly unable to amb further due to pain and fatigue.)    Transfers  Transfer:  (Sit to stand and pivot transfers with walker mod independent.)    Stairs  Stairs:  (Pt neogtiating 4 inch curb steps as per home environment.  Pt negotiating curb steps with walker and CGA.  Pt also negotiating 5 stairs with bilateral rails and min assist.)     Object From Floor  Devices: Reacher, Rolling walker  Assist Level: Independent    EDUCATION:  Education Comment:  (Pt educated on safe mobility techniques and home safety.)    Encounter Problems       Encounter Problems (Active)       PT Problem       LTG - Pt will perform bed mobility with SBA  (Progressing)       Start:  03/22/25    Expected End:  03/29/25            LTG - Pt will perform transfers with SBA.  (Progressing)       Start:  03/22/25    Expected End:  03/29/25            LTG - Pt will amb 30 feet with FWW and SBA.   (Progressing)       Start:  03/22/25    Expected End:  03/29/25            LTG - Pt will up/down 1+1 stairs with device and min/mod A x 2 (Progressing)       Start:  03/22/25    Expected End:  03/29/25               Pain - Adult

## 2025-03-29 NOTE — CARE PLAN
The patient's goals for the shift include      The clinical goals for the shift include patient will remain free from falls      Problem: Skin  Goal: Participates in plan/prevention/treatment measures  Outcome: Progressing  Goal: Promote/optimize nutrition  Outcome: Progressing  Goal: Promote skin healing  Outcome: Progressing     Problem: Pain  Goal: Walks with improved pain control throughout the shift  Outcome: Progressing  Goal: Performs ADL's with improved pain control throughout shift  Outcome: Progressing  Goal: Participates in PT with improved pain control throughout the shift  Outcome: Progressing     Problem: Pain - Adult  Goal: Verbalizes/displays adequate comfort level or baseline comfort level  Outcome: Progressing     Problem: Safety - Adult  Goal: Free from fall injury  Outcome: Progressing     Problem: Discharge Planning  Goal: Discharge to home or other facility with appropriate resources  Outcome: Progressing     Problem: Chronic Conditions and Co-morbidities  Goal: Patient's chronic conditions and co-morbidity symptoms are monitored and maintained or improved  Outcome: Progressing     Problem: Nutrition  Goal: Nutrient intake appropriate for maintaining nutritional needs  Outcome: Progressing

## 2025-03-29 NOTE — PROGRESS NOTES
Occupational Therapy    OT Treatment    Patient Name: Jen Daly  MRN: [id number]  Department: [facility] REHAB  Room: [address] [place] [address]  Today's Date: 3/29/2025  Time Calculation  Start Time: 0745  Stop Time: 0830  Time Calculation (min): 45 min        Assessment:  End of Session Communication:  (handed off to PT)  End of Session Patient Position: Up in chair, Alarm on     Plan:  Treatment Interventions: ADL retraining, UE strengthening/ROM, Functional transfer training, Endurance training, Patient/family training, Equipment evaluation/education  OT Frequency: 90 min/5 days per week (x 1 week)  OT Discharge Recommendations:  (anticipate need for sba/cga for transfers, mobility, ADLs; intermittent assist for higher level IADLs)  Equipment Recommended upon Discharge: Wheeled walker (3:1 commode, shower seat, sock aid, reacher)  Treatment Interventions: ADL retraining, UE strengthening/ROM, Functional transfer training, Endurance training, Patient/family training, Equipment evaluation/education    Subjective   Previous Visit Info:  OT Last Visit  OT Received On: 03/29/25  General:  General  Prior to Session Communication: Bedside nurse  Patient Position Received: Up in chair, Alarm on  General Comment: patient very tearful and anxious this AM; nurse present, reporting patient with high BP, clearing patient for therapy. patient requiring encouragement to participate and relaxation techniques throughout session  Precautions:  LE Weight Bearing Status: Weight Bearing as Tolerated  Medical Precautions: Fall precautions  Post-Surgical Precautions: Right total knee precautions            Pain:  Pain Assessment  Pain Assessment:  (reports 6/10 pain in RLE; premedicated. repositioned EOS for comfort and ice pack given EOS)    Objective         Activities of Daily Living: Grooming  Grooming Level of Assistance: Modified independent (items already at sink)  Grooming Where Assessed: Sitting sinkside  Grooming Comments: to brush teeth and  wash face    UE Bathing  UE Bathing Level of Assistance: Setup  UE Bathing Where Assessed: Sitting sinkside (w/c)    LE Bathing  LE Bathing Comments: declines to complete LB bathing this date; reports completed earlier when nursing staff assisted her with changing brief    UE Dressing  UE Dressing Level of Assistance: Setup  UE Dressing Where Assessed: Wheelchair    LE Dressing  LE Dressing: Yes  LE Dressing Adaptive Equipment: Sock aide, Reacher  Pants Level of Assistance: Close supervision (seated in w/c, standing at bed rail when donning over waist/hips   Sock Level of Assistance: Setup (seated in w/c)    Toileting  Toileting Comments: declines need to toilet this session       Bed Mobility/Transfers: Transfers  Transfer:  (completed STS during LB dressing task at bedrail with SBA)      Education Documentation  Body Mechanics, taught by Yin Melendez OT at 3/29/2025 11:43 AM.  Learner: Patient  Readiness: Acceptance  Method: Explanation  Response: Verbalizes Understanding, Needs Reinforcement  Comment: pursed lip breathing techniques    Precautions, taught by Yin Melendez OT at 3/29/2025 11:43 AM.  Learner: Patient  Readiness: Acceptance  Method: Explanation  Response: Verbalizes Understanding, Needs Reinforcement  Comment: pursed lip breathing techniques    ADL Training, taught by Yin Melendez OT at 3/29/2025 11:43 AM.  Learner: Patient  Readiness: Acceptance  Method: Explanation  Response: Verbalizes Understanding, Needs Reinforcement  Comment: pursed lip breathing techniques    Education Comments  No comments found.        OP EDUCATION:       Goals:  Encounter Problems       Encounter Problems (Active)       OT Problem       LTG - Patient will complete grooming independently.  (Progressing)       Start:  03/22/25    Expected End:  04/03/25            LTG - Patient will complete toileting with sba. (Progressing)       Start:  03/22/25    Expected End:  04/03/25            LTG - Patient will complete  bathing with sba. (Progressing)       Start:  03/22/25    Expected End:  04/03/25            LTG - Patient will complete UE dressing with set up. (Progressing)       Start:  03/22/25    Expected End:  04/03/25            LTG - Patient will complete LE dressing using adaptive equip as needed with sba. (Progressing)       Start:  03/22/25    Expected End:  04/03/25            LTG - Patient will complete commode transfer via device as needed with sba. (Progressing)       Start:  03/22/25    Expected End:  04/03/25            LTG - Patient will complete shower transfer using DME as needed with cga. (Progressing)       Start:  03/22/25    Expected End:  04/03/25            LTG - Patient will complete functional mobility via device as needed with sba. (Progressing)       Start:  03/22/25    Expected End:  04/03/25

## 2025-03-29 NOTE — PROGRESS NOTES
Physical Therapy    Physical Therapy Treatment    Patient Name: Jen Daly  MRN: [id number]  Department: [facility] REHAB  Room: [address] [place], [place] [address]  Today's Date: 3/29/2025  Time Calculation  Start Time: 1000  Stop Time: 1045  Time Calculation (min): 45 min         Assessment/Plan         PT Plan  Treatment/Interventions: Bed mobility, Transfer training, Gait training, Balance training, Neuromuscular re-education, Strengthening, Endurance training, Therapeutic exercise, Therapeutic activity, Home exercise program, Stair training  PT Plan: Ongoing PT  PT Frequency: 90 min/5 days per week (for 1 week)  PT Discharge Recommendations: Other (comment) (Anticipate discharge home at walker level with recommendaton for SBA with intermittent assist for high level tasks)  Equipment Recommended upon Discharge: Wheeled walker  PT Recommended Transfer Status: Assist x2      General Visit Information:   PT  Visit  PT Received On: 03/29/25  General  Family/Caregiver Present:  (Pt's spouse and son present for education and hands-on training.)  Prior to Session Communication:  (OT)  Patient Position Received: Alarm on, Up in chair  General Comment: Pt labile and anxious.  Education and emotional support provided.  Nursing aware.    Subjective   Precautions:  Precautions  LE Weight Bearing Status: Weight Bearing as Tolerated (RLE)  Medical Precautions: Fall precautions  Post-Surgical Precautions: Right total knee precautions        Objective   Pain:  Pain Assessment  Pain Assessment: 0-10  0-10 (Numeric) Pain Score: 6  Pain Type: Surgical pain  Pain Location: Knee  Pain Orientation: Right  Pain Interventions: Repositioned, Ambulation/increased activity, Distraction, Emotional support  Response to Interventions: No change in pain     Treatments:  Therapeutic Exercise  Therapeutic Exercise Performed:  (Pt performed supine TKR mat ex program 1-2 x 10 reps each.   Ex performed to improve strength and knee ROM.  Right knee ROM 8-85 degrees.)    Bed  Mobility  Bed Mobility:  (Bed mobility training:  Rolling with set-up.  Sit to supine mod independent with leg .  Supine to sit independent.)    Transfers  Transfer:  (Repeated pivot transfer training to the left and right with walker mod independent.)    EDUCATION:  Education Comment:  (Pt educated on safe mobility techniques in the home.)    Encounter Problems       Encounter Problems (Active)       PT Problem       LTG - Pt will perform bed mobility with SBA  (Progressing)       Start:  03/22/25    Expected End:  03/29/25            LTG - Pt will perform transfers with SBA.  (Progressing)       Start:  03/22/25    Expected End:  03/29/25            LTG - Pt will amb 30 feet with FWW and SBA.   (Progressing)       Start:  03/22/25    Expected End:  03/29/25            LTG - Pt will up/down 1+1 stairs with device and min/mod A x 2 (Progressing)       Start:  03/22/25    Expected End:  03/29/25               Pain - Adult

## 2025-03-30 VITALS
BODY MASS INDEX: 35.38 KG/M2 | TEMPERATURE: 96.4 F | OXYGEN SATURATION: 97 % | RESPIRATION RATE: 18 BRPM | WEIGHT: 187.39 LBS | HEIGHT: 61 IN | DIASTOLIC BLOOD PRESSURE: 79 MMHG | SYSTOLIC BLOOD PRESSURE: 190 MMHG | HEART RATE: 80 BPM

## 2025-03-30 PROCEDURE — 99239 HOSP IP/OBS DSCHRG MGMT >30: CPT | Performed by: PHYSICAL MEDICINE & REHABILITATION

## 2025-03-30 PROCEDURE — 2500000002 HC RX 250 W HCPCS SELF ADMINISTERED DRUGS (ALT 637 FOR MEDICARE OP, ALT 636 FOR OP/ED): Performed by: PHYSICAL MEDICINE & REHABILITATION

## 2025-03-30 PROCEDURE — 2500000001 HC RX 250 WO HCPCS SELF ADMINISTERED DRUGS (ALT 637 FOR MEDICARE OP): Performed by: PHYSICAL MEDICINE & REHABILITATION

## 2025-03-30 RX ORDER — ACETAMINOPHEN 325 MG/1
650 TABLET ORAL EVERY 6 HOURS PRN
Qty: 30 TABLET | Refills: 0 | Status: SHIPPED | OUTPATIENT
Start: 2025-03-30

## 2025-03-30 RX ORDER — OXYCODONE HYDROCHLORIDE 5 MG/1
5 TABLET ORAL EVERY 4 HOURS PRN
Qty: 10 TABLET | Refills: 0 | Status: SHIPPED | OUTPATIENT
Start: 2025-03-30

## 2025-03-30 RX ADMIN — Medication 1 TABLET: at 09:04

## 2025-03-30 RX ADMIN — VALSARTAN 320 MG: 80 TABLET, FILM COATED ORAL at 09:04

## 2025-03-30 RX ADMIN — METOPROLOL TARTRATE 50 MG: 50 TABLET, FILM COATED ORAL at 09:04

## 2025-03-30 RX ADMIN — HYDRALAZINE HYDROCHLORIDE 50 MG: 50 TABLET ORAL at 05:33

## 2025-03-30 RX ADMIN — APIXABAN 2.5 MG: 2.5 TABLET, FILM COATED ORAL at 09:04

## 2025-03-30 RX ADMIN — ACETAMINOPHEN 650 MG: 325 TABLET, FILM COATED ORAL at 05:43

## 2025-03-30 ASSESSMENT — PAIN SCALES - GENERAL
PAINLEVEL_OUTOF10: 0 - NO PAIN
PAINLEVEL_OUTOF10: 3
PAINLEVEL_OUTOF10: 3

## 2025-03-30 ASSESSMENT — PAIN - FUNCTIONAL ASSESSMENT
PAIN_FUNCTIONAL_ASSESSMENT: 0-10
PAIN_FUNCTIONAL_ASSESSMENT: 0-10

## 2025-03-30 ASSESSMENT — PAIN DESCRIPTION - DESCRIPTORS: DESCRIPTORS: ACHING

## 2025-03-30 ASSESSMENT — PAIN DESCRIPTION - LOCATION: LOCATION: HEAD

## 2025-03-30 NOTE — DISCHARGE SUMMARY
Discharge Diagnosis  History of total right knee replacement (TKR)    Issues Requiring Follow-Up  Tka     Discharge Meds     Medication List      START taking these medications     acetaminophen 325 mg tablet; Commonly known as: Tylenol; Take 2 tablets   (650 mg) by mouth every 6 hours if needed for moderate pain (4 - 6), mild   pain (1 - 3), headaches or fever (temp greater than 38.0 C).; Replaces:   acetaminophen 650 mg ER tablet   oxyCODONE 5 mg immediate release tablet; Commonly known as: Roxicodone;   Take 1 tablet (5 mg) by mouth every 4 hours if needed for severe pain (7 -   10).; Replaces: oxyCODONE 5 mg immediate release capsule     CONTINUE taking these medications     apixaban 2.5 mg tablet; Commonly known as: Eliquis; Take 1 tablet (2.5   mg) by mouth 2 times a day for 20 days.   ascorbic acid 500 mg ER capsule; Commonly known as: Vitamin C   Caltrate with Vitamin D3 600 mg-20 mcg (800 unit) tablet; Generic drug:   calcium carbonate-vitamin D3   candesartan 32 mg tablet; Commonly known as: Atacand; TAKE 1 TABLET (32   MG) BY MOUTH ONCE DAILY.   clonazePAM 1 mg tablet; Commonly known as: KlonoPIN; Take 1 tablet (1   mg) by mouth 2 times a day as needed for anxiety.   hydrALAZINE 50 mg tablet; Commonly known as: Apresoline   metoprolol tartrate 50 mg tablet; Commonly known as: Lopressor; Take 1   tablet by mouth 2 times a day.     STOP taking these medications     acetaminophen 650 mg ER tablet; Commonly known as: Tylenol 8 HOUR;   Replaced by: acetaminophen 325 mg tablet   doxycycline 100 mg tablet; Commonly known as: Adoxa   methocarbamol 750 mg tablet; Commonly known as: Robaxin   oxyCODONE 5 mg immediate release capsule; Commonly known as: Oxy-IR;   Replaced by: oxyCODONE 5 mg immediate release tablet       Test Results Pending At Discharge  Pending Labs       No current pending labs.            Hospital Course     Jen Daly is a 75 y.o. female presenting with a right total knee replacement on 3/18  at [facility].  Postoperatively she felt very weak like her right leg was going to give out on her.  She did have multiple falls after her left knee replacement in November 2024.  She also had some mild generalized redness of her right knee and was started on doxycycline for this.  She was hospitalized for 3 days and had a pain infusion, and is having difficulty with pain control while ambulating.  She is admitted for rehabilitation to improve her weightbearing status, pain management, as well as for strengthening and range of motion exercises.     She participated in 3 hours of PT/OT and improved to the point she was supervision to sBA. She has anxiety that will limit her function.     Her BP was elevated and she continued on her current medications.  She did respond well to medications for blood pressure management.    Patient was on Eliquis for DVT prophylaxis.  She will continue this for total of 30 days post total knee arthroplasty as prescribed by her surgeon.    Patient was safe for discharge home with home health care PT and OT on 330 at a supervision/standby assist level overall.  She met her goals at rehab.      Pertinent Physical Exam At Time of Discharge  Physical Exam  Pleasant female no apparent distress  Alert and oriented x 3  Chest clear to auscultation bilaterally  Cardiovascular S1-S2  Abdomen soft nontender nondistended with active bowel sounds  Right knee with Aquacel dressing in place with negative Homans bilaterally and mild right lower extremity edema.  No erythema.  Functioning at a   to supervision level standby assist  Outpatient Follow-Up  No future appointments.  She is to follow-up with her Ortho surgeon as scheduled.    Time spent with the patient today for discharge including: history, physical exam, coordination of care with the rehab team members, communication with patient and/or family, medication reconciliation and documentation was 35 minutes.        Alanis ONTIVEROS  MD Zachariah

## 2025-03-30 NOTE — CARE PLAN
The patient's goals for the shift include      The clinical goals for the shift include Patient will have pain under control    Problem: Skin  Goal: Participates in plan/prevention/treatment measures  Outcome: Progressing  Flowsheets (Taken 3/29/2025 2357)  Participates in plan/prevention/treatment measures:   Discuss with provider PT/OT consult   Elevate heels   Increase activity/out of bed for meals  Goal: Promote/optimize nutrition  Outcome: Progressing  Flowsheets (Taken 3/29/2025 2357)  Promote/optimize nutrition:   Assist with feeding   Discuss with provider if NPO > 2 days   Offer water/supplements/favorite foods   Consume > 50% meals/supplements   Monitor/record intake including meals  Goal: Promote skin healing  Outcome: Progressing  Flowsheets (Taken 3/29/2025 2357)  Promote skin healing:   Assess skin/pad under line(s)/device(s)   Ensure correct size (line/device) and apply per  instructions   Protective dressings over bony prominences

## 2025-03-31 ASSESSMENT — BRIEF INTERVIEW FOR MENTAL STATUS (BIMS)
ASKED TO RECALL BLUE: YES, AFTER CUEING ("A COLOR")
BIMS SUMMARY SCORE: 13
ASKED TO RECALL SOCK: YES, AFTER CUEING ("SOMETHING TO WEAR")
WHAT YEAR IS IT: CORRECT
WHAT MONTH IS IT: ACCURATE WITHIN 5 DAYS
WHAT DAY OF THE WEEK IS IT: CORRECT
ASKED TO RECALL BED: YES, NO CUE REQUIRED
INITIAL REPETITION OF BED BLUE SOCK - FIRST ATTEMPT: 3

## 2025-03-31 NOTE — CARE PLAN
Pt has achieved 4/4 LTGs from initial eval.  Pt discharged home with assist from spouse and son and to receive out-patient PT services.  Pt already owns wheeled walker for home use.  Pt's spouse and son present for education and hands-on training on 3/28/25.  Please see daily note from the 28th for details.    Problem: PT Problem  Goal: LTG - Pt will perform bed mobility with SBA   Outcome: Met  Goal: LTG - Pt will perform transfers with SBA.   Outcome: Met  Goal: LTG - Pt will amb 30 feet with FWW and SBA.    Outcome: Met  Goal: LTG - Pt will up/down 1+1 stairs with device and min/mod A x 2  Outcome: Met

## 2025-04-01 NOTE — CARE PLAN
Patient has achieved 7/8 LTGs established from initial eval. Pt discharged home with assist from spouse and son with no further OT services. Pt's spouse and son present for education and hands-on training on 3/28/25. Please see daily note from the 28th for details.   Problem: OT Problem  Goal: LTG - Patient will complete grooming independently.   Outcome: Met  Goal: LTG - Patient will complete bathing with sba.  Outcome: Met  Goal: LTG - Patient will complete UE dressing with set up.  Outcome: Met  Goal: LTG - Patient will complete LE dressing using adaptive equip as needed with sba.  Outcome: Met  Goal: LTG - Patient will complete commode transfer via device as needed with sba.  Outcome: Met  Goal: LTG - Patient will complete shower transfer using DME as needed with cga.  Outcome: Met  Goal: LTG - Patient will complete functional mobility via device as needed with sba.  Outcome: Met     Problem: OT Problem  Goal: LTG - Patient will complete toileting with sba.  Outcome: Not met

## 2025-05-05 DIAGNOSIS — I15.9 BENIGN SECONDARY HYPERTENSION: ICD-10-CM

## 2025-05-05 RX ORDER — CANDESARTAN 32 MG/1
32 TABLET ORAL DAILY
Qty: 90 TABLET | Refills: 0 | Status: SHIPPED | OUTPATIENT
Start: 2025-05-05

## 2025-07-14 ENCOUNTER — TELEPHONE (OUTPATIENT)
Dept: PRIMARY CARE | Facility: CLINIC | Age: 75
End: 2025-07-14
Payer: MEDICARE

## 2025-07-14 NOTE — TELEPHONE ENCOUNTER
Pt here last October & had both knees replaced since then.  Can she get a refill onRx Refill Request Telephone Encounter    Name:  Jen Daly  :  [id number]  Medication Name:  clonazepam 1 mg=twice daily  Specific Pharmacy location:  [phone]  Date of last appointment:  10/2024  Date of next appointment:  n/a  Best number to reach patient:  [phone]  Allergy to aspirin & salicylates  Ty

## 2025-07-22 ENCOUNTER — APPOINTMENT (OUTPATIENT)
Dept: PRIMARY CARE | Facility: CLINIC | Age: 75
End: 2025-07-22
Payer: MEDICARE

## 2025-07-22 VITALS
TEMPERATURE: 98.6 F | HEART RATE: 48 BPM | HEIGHT: 62 IN | OXYGEN SATURATION: 94 % | BODY MASS INDEX: 33.71 KG/M2 | DIASTOLIC BLOOD PRESSURE: 62 MMHG | SYSTOLIC BLOOD PRESSURE: 175 MMHG | WEIGHT: 183.2 LBS

## 2025-07-22 DIAGNOSIS — N39.3 STRESS INCONTINENCE IN FEMALE: ICD-10-CM

## 2025-07-22 DIAGNOSIS — Z51.81 MEDICATION MONITORING ENCOUNTER: ICD-10-CM

## 2025-07-22 DIAGNOSIS — F41.9 ANXIETY: Primary | ICD-10-CM

## 2025-07-22 PROBLEM — E66.9 OBESITY (BMI 30-39.9): Status: ACTIVE | Noted: 2025-03-07

## 2025-07-22 PROBLEM — G89.18 POST-OP PAIN: Status: ACTIVE | Noted: 2024-11-13

## 2025-07-22 PROBLEM — R00.1 SINUS BRADYCARDIA: Status: ACTIVE | Noted: 2024-10-31

## 2025-07-22 PROBLEM — E78.5 HLD (HYPERLIPIDEMIA): Status: ACTIVE | Noted: 2025-03-07

## 2025-07-22 PROBLEM — T84.038A LOOSE TOTAL KNEE ARTHROPLASTY: Status: ACTIVE | Noted: 2024-11-12

## 2025-07-22 PROBLEM — I10 BENIGN HYPERTENSION: Status: ACTIVE | Noted: 2024-10-29

## 2025-07-22 PROBLEM — E66.812 OBESITY, CLASS II, BMI 35-39.9: Status: ACTIVE | Noted: 2025-03-20

## 2025-07-22 PROBLEM — Z96.659 LOOSE TOTAL KNEE ARTHROPLASTY: Status: ACTIVE | Noted: 2024-11-12

## 2025-07-22 PROBLEM — R01.1 HEART MURMUR: Status: ACTIVE | Noted: 2024-10-29

## 2025-07-22 PROBLEM — R32 URINARY INCONTINENCE: Status: ACTIVE | Noted: 2025-02-14

## 2025-07-22 PROCEDURE — 1159F MED LIST DOCD IN RCRD: CPT | Performed by: FAMILY MEDICINE

## 2025-07-22 PROCEDURE — 99213 OFFICE O/P EST LOW 20 MIN: CPT | Performed by: FAMILY MEDICINE

## 2025-07-22 PROCEDURE — 1160F RVW MEDS BY RX/DR IN RCRD: CPT | Performed by: FAMILY MEDICINE

## 2025-07-22 PROCEDURE — G2211 COMPLEX E/M VISIT ADD ON: HCPCS | Performed by: FAMILY MEDICINE

## 2025-07-22 PROCEDURE — 3077F SYST BP >= 140 MM HG: CPT | Performed by: FAMILY MEDICINE

## 2025-07-22 PROCEDURE — 1125F AMNT PAIN NOTED PAIN PRSNT: CPT | Performed by: FAMILY MEDICINE

## 2025-07-22 PROCEDURE — 1036F TOBACCO NON-USER: CPT | Performed by: FAMILY MEDICINE

## 2025-07-22 PROCEDURE — 3078F DIAST BP <80 MM HG: CPT | Performed by: FAMILY MEDICINE

## 2025-07-22 RX ORDER — DOXYCYCLINE HYCLATE 100 MG
TABLET ORAL
COMMUNITY
End: 2025-07-22 | Stop reason: ALTCHOICE

## 2025-07-22 RX ORDER — OXYBUTYNIN CHLORIDE 10 MG/1
10 TABLET, EXTENDED RELEASE ORAL DAILY
Qty: 90 TABLET | Refills: 3 | Status: SHIPPED | OUTPATIENT
Start: 2025-07-22 | End: 2026-07-22

## 2025-07-22 RX ORDER — MUPIROCIN 20 MG/G
OINTMENT TOPICAL
COMMUNITY

## 2025-07-22 RX ORDER — ONDANSETRON 4 MG/1
TABLET, FILM COATED ORAL
COMMUNITY

## 2025-07-22 RX ORDER — SENNOSIDES 8.6 MG/1
17.2 TABLET ORAL DAILY PRN
COMMUNITY
Start: 2025-03-19

## 2025-07-22 RX ORDER — CLONAZEPAM 1 MG/1
1 TABLET ORAL 2 TIMES DAILY PRN
Qty: 60 TABLET | Refills: 2 | Status: SHIPPED | OUTPATIENT
Start: 2025-07-22

## 2025-07-22 RX ORDER — TIZANIDINE 2 MG/1
TABLET ORAL
COMMUNITY

## 2025-07-22 RX ORDER — DOXYCYCLINE 100 MG/1
CAPSULE ORAL
COMMUNITY
Start: 2024-11-13 | End: 2025-07-22 | Stop reason: ALTCHOICE

## 2025-07-22 ASSESSMENT — PATIENT HEALTH QUESTIONNAIRE - PHQ9
2. FEELING DOWN, DEPRESSED OR HOPELESS: NOT AT ALL
2. FEELING DOWN, DEPRESSED OR HOPELESS: NOT AT ALL
1. LITTLE INTEREST OR PLEASURE IN DOING THINGS: NOT AT ALL
SUM OF ALL RESPONSES TO PHQ9 QUESTIONS 1 AND 2: 0
1. LITTLE INTEREST OR PLEASURE IN DOING THINGS: NOT AT ALL
SUM OF ALL RESPONSES TO PHQ9 QUESTIONS 1 AND 2: 0

## 2025-07-22 ASSESSMENT — ENCOUNTER SYMPTOMS
OCCASIONAL FEELINGS OF UNSTEADINESS: 0
SLEEP DISTURBANCE: 1
DEPRESSION: 0
LOSS OF SENSATION IN FEET: 0
CONSTITUTIONAL NEGATIVE: 1

## 2025-07-22 ASSESSMENT — PAIN SCALES - GENERAL: PAINLEVEL_OUTOF10: 6

## 2025-07-22 NOTE — PROGRESS NOTES
"Subjective   Patient ID: Jen Daly is a 75 y.o. female who presents for Follow-up (Med refills).   3 month med refills  HPI     Review of Systems   Constitutional: Negative.    Psychiatric/Behavioral:  Positive for sleep disturbance.        Objective   /62 (BP Location: Right arm)   Pulse (!) 48   Temp 37 °C (98.6 °F) (Oral)   Ht 1.562 m (5' 1.5\")   Wt 83.1 kg (183 lb 3.2 oz)   SpO2 94%   BMI 34.05 kg/m²     Physical Exam  Vitals and nursing note reviewed.   Constitutional:       Appearance: Normal appearance.     Musculoskeletal:         General: Normal range of motion.     Neurological:      General: No focal deficit present.      Mental Status: She is alert and oriented to person, place, and time.     Psychiatric:         Mood and Affect: Mood normal.         Behavior: Behavior normal.         Assessment/Plan patient seen here for follow-up on her clonazepam which continues to be effective we refilled her meds we will see her back in 3 months  Problem List Items Addressed This Visit           ICD-10-CM    Anxiety F41.9    Relevant Medications    clonazePAM (KlonoPIN) 1 mg tablet    Other Relevant Orders    Drug Screen, Urine With Reflex to Confirmation     Other Visit Diagnoses         Codes      Medication monitoring encounter     Z51.81    Relevant Orders    Drug Screen, Urine With Reflex to Confirmation             OARRS:  Evert Dempsey DO on 7/22/2025 11:32 AM  I have personally reviewed the OARRS report for Jen Daly. I have considered the risks of abuse, dependence, addiction and diversion    Is the patient prescribed a combination of a benzodiazepine and opioid?  Yes, I feel it is clincially indicated to continue the medication and have discussed with the patient risks/benefits/alternatives.    Last Urine Drug Screen / ordered today: Yes  No results found for this or any previous visit (from the past 8760 hours).  Results are as expected.     Controlled Substance Agreement:  Date " of the Last Agreement: 7/22/25  Reviewed Controlled Substance Agreement including but not limited to the benefits, risks, and alternatives to treatment with a Controlled Substance medication(s).    Benzodiazepines:  What is the patient's goal of therapy? sleep  Is this being achieved with current treatment? yes    JOAQUIN-7:  No data recorded    Activities of Daily Living:   Is your overall impression that this patient is benefiting (symptom reduction outweighs side effects) from benzodiazepine therapy? Yes     1. Physical Functioning: Better  2. Family Relationship: Better  3. Social Relationship: Better  4. Mood: Better  5. Sleep Patterns: Better  6. Overall Function: Better

## 2025-07-25 LAB
1OH-MIDAZOLAM UR-MCNC: NEGATIVE NG/ML
7AMINOCLONAZEPAM UR-MCNC: 402 NG/ML
A-OH ALPRAZ UR-MCNC: NEGATIVE NG/ML
A-OH-TRIAZOLAM UR-MCNC: NEGATIVE NG/ML
AMPHETAMINES UR QL: NEGATIVE NG/ML
BARBITURATES UR QL: NEGATIVE NG/ML
BENZODIAZ UR QL: POSITIVE NG/ML
BZE UR QL: NEGATIVE NG/ML
CREAT UR-MCNC: 143.2 MG/DL
DRUG SCREEN COMMENT UR-IMP: ABNORMAL
FENTANYL UR QL SCN: NEGATIVE NG/ML
LORAZEPAM UR-MCNC: NEGATIVE NG/ML
METHADONE UR QL: NEGATIVE NG/ML
NORDIAZEPAM UR-MCNC: NEGATIVE NG/ML
OH-ETHYLFLURAZ UR-MCNC: NEGATIVE NG/ML
OPIATES UR QL: NEGATIVE NG/ML
OXAZEPAM UR-MCNC: NEGATIVE NG/ML
OXIDANTS UR QL: NEGATIVE MCG/ML
OXYCODONE UR QL: NEGATIVE NG/ML
PCP UR QL: NEGATIVE NG/ML
PH UR: 5.6 [PH] (ref 4.5–9)
QUEST NOTES AND COMMENTS: ABNORMAL
TEMAZEPAM UR-MCNC: NEGATIVE NG/ML
THC UR QL: NEGATIVE NG/ML

## 2025-08-03 DIAGNOSIS — I15.9 BENIGN SECONDARY HYPERTENSION: ICD-10-CM

## 2025-08-04 RX ORDER — CANDESARTAN 32 MG/1
32 TABLET ORAL DAILY
Qty: 90 TABLET | Refills: 0 | Status: SHIPPED | OUTPATIENT
Start: 2025-08-04